# Patient Record
Sex: FEMALE | Race: BLACK OR AFRICAN AMERICAN | NOT HISPANIC OR LATINO | Employment: OTHER | URBAN - METROPOLITAN AREA
[De-identification: names, ages, dates, MRNs, and addresses within clinical notes are randomized per-mention and may not be internally consistent; named-entity substitution may affect disease eponyms.]

---

## 2017-01-14 ENCOUNTER — HOSPITAL ENCOUNTER (EMERGENCY)
Facility: OTHER | Age: 27
Discharge: HOME | End: 2017-01-14
Attending: EMERGENCY MEDICINE
Payer: MEDICAID

## 2017-01-14 VITALS
DIASTOLIC BLOOD PRESSURE: 68 MMHG | SYSTOLIC BLOOD PRESSURE: 106 MMHG | HEART RATE: 74 BPM | HEIGHT: 65 IN | TEMPERATURE: 99 F | BODY MASS INDEX: 22.33 KG/M2 | WEIGHT: 134 LBS | RESPIRATION RATE: 17 BRPM | OXYGEN SATURATION: 99 %

## 2017-01-14 DIAGNOSIS — N93.8 DYSFUNCTIONAL UTERINE BLEEDING: Primary | ICD-10-CM

## 2017-01-14 LAB
ALBUMIN SERPL BCP-MCNC: 4.2 G/DL
ALP SERPL-CCNC: 52 U/L
ALT SERPL W/O P-5'-P-CCNC: 13 U/L
ANION GAP SERPL CALC-SCNC: 10 MMOL/L
AST SERPL-CCNC: 17 U/L
B-HCG UR QL: NEGATIVE
BACTERIA GENITAL QL WET PREP: ABNORMAL
BASOPHILS # BLD AUTO: 0.03 K/UL
BASOPHILS NFR BLD: 0.6 %
BILIRUB SERPL-MCNC: 1.2 MG/DL
BILIRUB UR QL STRIP: NEGATIVE
BUN SERPL-MCNC: 10 MG/DL
CALCIUM SERPL-MCNC: 9.5 MG/DL
CHLORIDE SERPL-SCNC: 106 MMOL/L
CLARITY UR: CLEAR
CLUE CELLS VAG QL WET PREP: ABNORMAL
CO2 SERPL-SCNC: 24 MMOL/L
COLOR UR: YELLOW
CREAT SERPL-MCNC: 1 MG/DL
CTP QC/QA: YES
DIFFERENTIAL METHOD: ABNORMAL
EOSINOPHIL # BLD AUTO: 0.1 K/UL
EOSINOPHIL NFR BLD: 1.9 %
ERYTHROCYTE [DISTWIDTH] IN BLOOD BY AUTOMATED COUNT: 13.4 %
EST. GFR  (AFRICAN AMERICAN): >60 ML/MIN/1.73 M^2
EST. GFR  (NON AFRICAN AMERICAN): >60 ML/MIN/1.73 M^2
FILAMENT FUNGI VAG WET PREP-#/AREA: ABNORMAL
GLUCOSE SERPL-MCNC: 83 MG/DL
GLUCOSE UR QL STRIP: NEGATIVE
HCG INTACT+B SERPL-ACNC: <1.2 MIU/ML
HCT VFR BLD AUTO: 40.6 %
HGB BLD-MCNC: 13.5 G/DL
HGB UR QL STRIP: NEGATIVE
KETONES UR QL STRIP: NEGATIVE
LEUKOCYTE ESTERASE UR QL STRIP: NEGATIVE
LYMPHOCYTES # BLD AUTO: 2.3 K/UL
LYMPHOCYTES NFR BLD: 43.2 %
MCH RBC QN AUTO: 27.6 PG
MCHC RBC AUTO-ENTMCNC: 33.3 %
MCV RBC AUTO: 83 FL
MONOCYTES # BLD AUTO: 0.4 K/UL
MONOCYTES NFR BLD: 8.2 %
NEUTROPHILS # BLD AUTO: 2.4 K/UL
NEUTROPHILS NFR BLD: 45.9 %
NITRITE UR QL STRIP: NEGATIVE
PH UR STRIP: 6 [PH] (ref 5–8)
PLATELET # BLD AUTO: 353 K/UL
PMV BLD AUTO: 9.2 FL
POTASSIUM SERPL-SCNC: 4 MMOL/L
PROT SERPL-MCNC: 8.9 G/DL
PROT UR QL STRIP: NEGATIVE
RBC # BLD AUTO: 4.9 M/UL
SODIUM SERPL-SCNC: 140 MMOL/L
SP GR UR STRIP: 1.02 (ref 1–1.03)
SPECIMEN SOURCE: ABNORMAL
T VAGINALIS GENITAL QL WET PREP: ABNORMAL
URN SPEC COLLECT METH UR: NORMAL
UROBILINOGEN UR STRIP-ACNC: NEGATIVE EU/DL
WBC # BLD AUTO: 5.25 K/UL
WBC #/AREA VAG WET PREP: ABNORMAL
YEAST GENITAL QL WET PREP: ABNORMAL

## 2017-01-14 PROCEDURE — 25000003 PHARM REV CODE 250: Performed by: PHYSICIAN ASSISTANT

## 2017-01-14 PROCEDURE — 87591 N.GONORRHOEAE DNA AMP PROB: CPT

## 2017-01-14 PROCEDURE — 84702 CHORIONIC GONADOTROPIN TEST: CPT

## 2017-01-14 PROCEDURE — 99284 EMERGENCY DEPT VISIT MOD MDM: CPT | Mod: 25

## 2017-01-14 PROCEDURE — 96360 HYDRATION IV INFUSION INIT: CPT

## 2017-01-14 PROCEDURE — 80053 COMPREHEN METABOLIC PANEL: CPT

## 2017-01-14 PROCEDURE — 85025 COMPLETE CBC W/AUTO DIFF WBC: CPT

## 2017-01-14 PROCEDURE — 81025 URINE PREGNANCY TEST: CPT | Performed by: EMERGENCY MEDICINE

## 2017-01-14 PROCEDURE — 81003 URINALYSIS AUTO W/O SCOPE: CPT

## 2017-01-14 PROCEDURE — 87210 SMEAR WET MOUNT SALINE/INK: CPT

## 2017-01-14 RX ADMIN — SODIUM CHLORIDE 1000 ML: 0.9 INJECTION, SOLUTION INTRAVENOUS at 07:01

## 2017-01-14 NOTE — ED AVS SNAPSHOT
[facility]  [address]  [place] [address]               Vivian Moran   2017  6:33 PM   ED    Description:  Female : 1990   Department:  [facility]           Your Care was Coordinated By:     Provider Role From To    Sofya Scanlon MD Attending Provider [phone] --    Shannan Cyr PA-C Physician Assistant [phone] --      Reason for Visit     Vaginal Bleeding           Diagnoses this Visit        Comments    Dysfunctional uterine bleeding    -  Primary       ED Disposition     None           To Do List           [facility]     [facility] -  Assistance  Registered nurses in the [facility] provide clinical advisement, health education, appointment booking, and other advisory services.  Call for this free service at [phone].             Medications           Message regarding Medications     Verify the changes and/or additions to your medication regime listed below are the same as discussed with your clinician today.  If any of these changes or additions are incorrect, please notify your healthcare provider.        These medications were administered today        Dose Freq    sodium chloride 0.9% bolus 1,000 mL 1,000 mL ED 1 Time    Sig: Inject 1,000 mLs into the vein ED 1 Time.    Class: Normal    Route: Intravenous      STOP taking these medications     misoprostol (CYTOTEC) 200 MCG Tab Place 3 tablets (600 mcg total) vaginally every 8 (eight) hours.           Verify that the below list of medications is an accurate representation of the medications you are currently taking.  If none reported, the list may be blank. If incorrect, please contact your healthcare provider. Carry this list with you in case of emergency.           Current Medications     ibuprofen (ADVIL,MOTRIN) 600 MG tablet Take 1 tablet (600 mg total) by mouth every 6 (six) hours as needed for Pain.          "  Clinical Reference Information           Your Vitals Were     BP Pulse Temp Resp Height Weight    108/68 72 98.8 °F (37.1 °C) (Oral) 17 5' 5" (1.651 m) 60.8 kg (134 lb)    SpO2 BMI             100% 22.3 kg/m2         Allergies as of 1/14/2017     No Known Allergies      Immunizations Administered on Date of Encounter - 1/14/2017     None      ED Micro, Lab, POCT     Start Ordered       Status Ordering Provider    01/14/17 2030 01/14/17 2030  Vaginal Screen Vagina  STAT      Final result     01/14/17 2030 01/14/17 2030  C. trachomatis/N. gonorrhoeae by AMP DNA Vagina  STAT      In process     01/14/17 1903 01/14/17 1903  CBC W/ AUTO DIFFERENTIAL  STAT      Final result     01/14/17 1903 01/14/17 1903  Comp. Metabolic Panel  STAT      Final result     01/14/17 1903 01/14/17 1903  hCG, quantitative  STAT      Final result     01/14/17 1903 01/14/17 1903  Urinalysis  STAT      Final result     01/14/17 1818 01/14/17 1817  POCT urine pregnancy  Once      Final result       ED Imaging Orders     Start Ordered       Status Ordering Provider    01/14/17 1903 01/14/17 1903  US Pelvis Complete Non OB  1 time imaging      Final result         Discharge Instructions         Dysfunctional Uterine Bleeding    Dysfunctional uterine bleeding is a condition in which bleeding is abnormal and occurs at unexpected times of the month. This happens due to changes in the hormones that help control a womans menstrual cycle each month.  The bleeding may be heavier or lighter than normal. If you have heavy bleeding often, this can lead to a problem called anemia. With anemia, your red blood cell count is too low. Red blood cells are needed because they help carry oxygen throughout your body. Severe anemia may cause you to look pale and feel very weak or tired. You might also become short of breath easily.  To treat dysfunctional uterine bleeding, medicines are often tried first. If these dont help, further testing and treatments may be " needed. Discuss all of your options with your provider.  Home care  Medicines  If youre prescribed medicines, be sure to take them as directed. Some of the more common medicines you may be prescribed include:  · Hormone therapy (Options include most methods of hormonal birth control such as pills, shots, or a hormone-releasing IUD)  · Nonsteroidal anti-inflammatory drugs (NSAIDs), such as ibuprofen  · Iron supplements, if you have anemia     General care  · Get plenty of rest if you tire easily. Avoid heavy exertion.  · To help relieve pain or cramping that may occur with bleeding, try using a heating pad on the lower belly or back. A warm bath may also help.  Follow-up care  Follow up with your healthcare provider as directed.  When to seek medical advice  Call your healthcare provider right away if:  · Bleeding becomes heavy (soaking 1 pad or tampon every hour for 3 hours)  · Increased abdominal pain  · Irregular bleeding worsens or does not get better even with treatment  · Fever of 100.4ºF (38ºC) or higher, or as directed by your provider  · Signs of anemia, such as pale skin, extreme fatigue or weakness, or shortness of breath  · Dizziness or fainting   © 4204-6483 [employer]. [address] [place] [address]. All rights reserved. This information is not intended as a substitute for professional medical care. Always follow your healthcare professional's instructions.          Discharge References/Attachments     BIRTH CONTROL METHODS (ENGLISH)       [facility] complies with applicable Federal civil rights laws and does not discriminate on the basis of race, color, national origin, age, disability, or sex.        Language Assistance Services     ATTENTION: Language assistance services are available, free of charge. Please call [phone].      ATENCIÓN: Si habla soledad, tiene a ramsey disposición servicios gratuitos de asistencia lingüística. Llame al  [phone].     JOSE Ý: N?u b?n nói Ti?ng Vi?t, có các d?ch v? h? tr? ngôn ng? mi?n phí dành cho b?n. G?i s? [phone].

## 2017-01-15 NOTE — ED PROVIDER NOTES
"Encounter Date: 2017       History     Chief Complaint   Patient presents with    Vaginal Bleeding     Pt reports intermittent vaginal bleeding after having an  at [facility] end of last year. Pt "wants to make sure it's all out"     Review of patient's allergies indicates:  No Known Allergies  HPI Comments: Patient is a 26 y.o. Female, A3, presenting to the emergency department with complaints of persistent vaginal bleeding.  The patient admits that she recently had a medical  performed on 10/24/16.  She does admit that it was her second one this year.  She states that since then, she's had persistent and intermittent vaginal bleeding.  She reports occurs every 2 days.  The patient states that after her previous medical  in the fall, she had retained products of conception was seen here in August.  She reports she is concerned that she is experiencing that problem again now.  She states that she did complete her follow-up appointment with the  clinic, and was told that everything was okay.  She reports some mild abdominal pain and cramping.  She denies taking any medication for symptoms thus far.    The history is provided by the patient.     History reviewed. No pertinent past medical history.  No past medical history pertinent negatives.  Past Surgical History   Procedure Laterality Date    Dilation and curettage of uterus       History reviewed. No pertinent family history.  Social History   Substance Use Topics    Smoking status: Never Smoker    Smokeless tobacco: None    Alcohol use Yes      Comment: ocassionally     Review of Systems   Constitutional: Negative for activity change, appetite change, chills, fatigue and fever.   HENT: Negative for congestion, rhinorrhea, sinus pressure, sneezing, sore throat and trouble swallowing.    Eyes: Negative for photophobia and visual disturbance.   Respiratory: Negative for cough, chest tightness, shortness of breath and wheezing.  "   Cardiovascular: Negative for chest pain and palpitations.   Gastrointestinal: Negative for abdominal pain, constipation, diarrhea, nausea and vomiting.   Genitourinary: Positive for vaginal bleeding. Negative for dysuria, hematuria and urgency.   Musculoskeletal: Negative for back pain, myalgias, neck pain and neck stiffness.   Skin: Negative for color change and wound.   Neurological: Negative for dizziness, weakness, light-headedness, numbness and headaches.   Psychiatric/Behavioral: Negative for agitation and confusion.       Physical Exam   Initial Vitals   BP Pulse Resp Temp SpO2   01/14/17 1812 01/14/17 1812 01/14/17 1812 01/14/17 1812 01/14/17 1812   117/68 103 17 98.8 °F (37.1 °C) 100 %     Physical Exam    Nursing note and vitals reviewed.  Constitutional: Vital signs are normal. She appears well-developed and well-nourished. She is not diaphoretic. She is cooperative.  Non-toxic appearance. She does not have a sickly appearance. She does not appear ill. No distress.   Well appearing, -American female unaccompanied in the emergency department.  She is speaking in clear and full sentences, moving all extremities.  She is in no acute distress.   HENT:   Head: Normocephalic and atraumatic.   Right Ear: External ear normal.   Left Ear: External ear normal.   Nose: Nose normal.   Mouth/Throat: Oropharynx is clear and moist.   Eyes: Conjunctivae and EOM are normal.   Neck: Normal range of motion. Neck supple.   Cardiovascular: Normal rate, regular rhythm and normal heart sounds.   Pulmonary/Chest: Breath sounds normal. No respiratory distress. She has no wheezes. She has no rhonchi. She has no rales.   Abdominal: Soft. Bowel sounds are normal. She exhibits no distension. There is no tenderness. There is no rebound and no guarding.   Genitourinary:   Genitourinary Comments: Normal appearance of the external genitalia, no skin lesions, erythema or masses. Pink vaginal mucosa. Cervix pink with no erythema,  moderate white discharge noted. Cervical os is closed. Uterus smooth and non-enlarged. No CMT, adnexal tenderness. Ovaries are non-palpable.    Musculoskeletal: Normal range of motion.   Neurological: She is alert and oriented to person, place, and time.   Skin: Skin is warm.   Psychiatric: She has a normal mood and affect. Her behavior is normal. Judgment and thought content normal.         ED Course   Procedures  Labs Reviewed   CBC W/ AUTO DIFFERENTIAL - Abnormal; Notable for the following:        Result Value    Platelets 353 (*)     All other components within normal limits   COMPREHENSIVE METABOLIC PANEL - Abnormal; Notable for the following:     Total Protein 8.9 (*)     Total Bilirubin 1.2 (*)     Alkaline Phosphatase 52 (*)     All other components within normal limits   VAGINAL SCREEN - Abnormal; Notable for the following:     Clue Cells, Wet Prep Few (*)     WBC - Vaginal Screen Few (*)     Bacteria - Vaginal Screen Many (*)     All other components within normal limits   C. TRACHOMATIS/N. GONORRHOEAE BY AMP DNA   HCG, QUANTITATIVE, PREGNANCY   URINALYSIS   POCT URINE PREGNANCY        Imaging Results         US Pelvis Complete Non OB (Final result) Result time:  01/14/17 19:53:51    Final result by Brooke Nugent MD (01/14/17 19:53:51)    Impression:        No significant sonographic pelvic abnormality identified.      Electronically signed by: BROOKE NUGENT MD, MD  Date:     01/14/17  Time:    19:53     Narrative:    Comparison: Pelvic ultrasound 8/22/16    Findings: Transabdominal only pelvic ultrasound performed. Patient denied transvaginal portion of the examination. The uterus appears anteverted and measures 5.7 cm in length and 3.6 x 4.8 cm in transverse dimensions.  The endometrium is normal thickness at 0.2 cm.   No discrete uterine fibroids identified.  The ovaries are normal in size and appearance containing small follicles.  The right ovary measures 1.8 x 1.9 x 2.4 cm.  The left ovary measures  2 x 2.3 x 1.7 cm.  Arterial and venous flow demonstrated in both ovaries.  No significant amount of free fluid identified.                 Medical Decision Making:   History:   Old Medical Records: I decided to obtain old medical records.  Old Records Summarized: other records.       <> Summary of Records: Reviewed medical record in epic including previous ED visit on 16 for persistent vaginal bleeding status post medical  when she was diagnosed with retained products of conception.  Clinical Tests:   Lab Tests: Ordered and Reviewed  The following lab test(s) were unremarkable: CBC, CMP, B-HCG, UPT and Urinalysis  Radiological Study: Ordered and Reviewed  Other:   I have discussed this case with another health care provider.       <> Summary of the Discussion: Dr. Scanlon  This note was created using Dragon Medical Dictation. There may be typographical errors secondary to dictation.     Urgent evaluation of a 26 y.o. female,A3, presenting to the emergency department complaining of persistent vaginal bleeding status post medical . Patient is afebrile, nontoxic appearing and hemodynamically stable.  Physical exam reveals regular rate and rhythm, lungs are clear to auscultation bilaterally.  Abdomen is soft and nontender.  Vaginal exam shows moderate white discharge in the vaginal vault, no blood.  No CMT or adnexal tenderness..  We'll obtain blood work, vaginal ultrasound, and plan to reassess.  CBC shows no leukocytosis, H&H is normal.  CMP shows no acute electrolyte abnormality, no elevation of LFTs.  UPT is negative.  UA shows no evidence of acute urinary tract infection.  Beta hCG is less than 1.2.  Ultrasound is obtained that shows no abnormality.  Vaginal screen shows few clue cells, patient is asymptotic, did not elect to treat.  At this time, no further testing or imaging is warranted.  The patient was counseled extensively on symptomatic care and treatment.  I did have a long discussion  with her on the importance of birth control.  She did request birth control from the emergency department, explained her that this is inappropriate, but would give her resources which to obtain some on an outpatient basis. The patient was instructed to follow up with a primary care provider in 2 days or to return to the emergency department for worsening symptoms. The treatment plan was discussed with the patient who demonstrated understanding and comfort with plan. The patient's history, physical exam, and plan of care was discussed with and agreed upon with my supervising physician.     History reviewed. No pertinent past medical history.                  ED Course     Clinical Impression:     1. Dysfunctional uterine bleeding       Disposition:   Disposition: Discharged  Condition: Stable       Shannan Cyr PA-C  01/14/17 [id number]

## 2017-01-15 NOTE — DISCHARGE INSTRUCTIONS
Dysfunctional Uterine Bleeding    Dysfunctional uterine bleeding is a condition in which bleeding is abnormal and occurs at unexpected times of the month. This happens due to changes in the hormones that help control a womans menstrual cycle each month.  The bleeding may be heavier or lighter than normal. If you have heavy bleeding often, this can lead to a problem called anemia. With anemia, your red blood cell count is too low. Red blood cells are needed because they help carry oxygen throughout your body. Severe anemia may cause you to look pale and feel very weak or tired. You might also become short of breath easily.  To treat dysfunctional uterine bleeding, medicines are often tried first. If these dont help, further testing and treatments may be needed. Discuss all of your options with your provider.  Home care  Medicines  If youre prescribed medicines, be sure to take them as directed. Some of the more common medicines you may be prescribed include:  · Hormone therapy (Options include most methods of hormonal birth control such as pills, shots, or a hormone-releasing IUD)  · Nonsteroidal anti-inflammatory drugs (NSAIDs), such as ibuprofen  · Iron supplements, if you have anemia     General care  · Get plenty of rest if you tire easily. Avoid heavy exertion.  · To help relieve pain or cramping that may occur with bleeding, try using a heating pad on the lower belly or back. A warm bath may also help.  Follow-up care  Follow up with your healthcare provider as directed.  When to seek medical advice  Call your healthcare provider right away if:  · Bleeding becomes heavy (soaking 1 pad or tampon every hour for 3 hours)  · Increased abdominal pain  · Irregular bleeding worsens or does not get better even with treatment  · Fever of 100.4ºF (38ºC) or higher, or as directed by your provider  · Signs of anemia, such as pale skin, extreme fatigue or weakness, or shortness of breath  · Dizziness or fainting   ©  3866-8533 [employer]. [address] [place] [address], [place] [address]. All rights reserved. This information is not intended as a substitute for professional medical care. Always follow your healthcare professional's instructions.

## 2017-01-15 NOTE — ED TRIAGE NOTES
Pt presents to ED with c/o intermittent vaginal bleeding since  aprox 2 months ago. Pt reports last episode of bleeding last week, reports some associated abdominal cramping. Pt denies any urinary symptoms. No other complaints. Pt AAO x3.

## 2017-01-17 LAB
C TRACH DNA SPEC QL NAA+PROBE: NEGATIVE
N GONORRHOEA DNA SPEC QL NAA+PROBE: NEGATIVE

## 2017-06-04 ENCOUNTER — HOSPITAL ENCOUNTER (EMERGENCY)
Facility: OTHER | Age: 27
Discharge: HOME | End: 2017-06-04
Attending: EMERGENCY MEDICINE
Payer: MEDICAID

## 2017-06-04 VITALS
BODY MASS INDEX: 22.49 KG/M2 | OXYGEN SATURATION: 99 % | WEIGHT: 135 LBS | HEIGHT: 65 IN | RESPIRATION RATE: 18 BRPM | TEMPERATURE: 98 F | HEART RATE: 84 BPM | DIASTOLIC BLOOD PRESSURE: 62 MMHG | SYSTOLIC BLOOD PRESSURE: 110 MMHG

## 2017-06-04 DIAGNOSIS — M79.671 FOOT PAIN, BILATERAL: ICD-10-CM

## 2017-06-04 DIAGNOSIS — S90.819A: Primary | ICD-10-CM

## 2017-06-04 DIAGNOSIS — M79.672 FOOT PAIN, BILATERAL: ICD-10-CM

## 2017-06-04 LAB
B-HCG UR QL: NEGATIVE
CTP QC/QA: YES

## 2017-06-04 PROCEDURE — 81025 URINE PREGNANCY TEST: CPT | Performed by: EMERGENCY MEDICINE

## 2017-06-04 PROCEDURE — 99283 EMERGENCY DEPT VISIT LOW MDM: CPT | Mod: 25

## 2017-06-04 RX ORDER — MUPIROCIN 20 MG/G
OINTMENT TOPICAL 3 TIMES DAILY
Qty: 22 G | Refills: 0 | Status: SHIPPED | OUTPATIENT
Start: 2017-06-04 | End: 2017-06-14

## 2017-06-04 RX ORDER — OXAPROZIN 600 MG/1
600 TABLET, FILM COATED ORAL 2 TIMES DAILY PRN
Qty: 20 TABLET | Refills: 0 | Status: SHIPPED | OUTPATIENT
Start: 2017-06-04 | End: 2017-07-06

## 2017-06-04 NOTE — ED TRIAGE NOTES
"Patient was at the nail salon and was getting a pedicure when they "shaved down" old skin on the bottom of her feet too far and she started bleeding.  Patient stated it happened to bilateral feet.  Patient stated the bleeding stopped this morning but that it is painful to stand on her feet due to it.   "

## 2017-06-04 NOTE — ED PROVIDER NOTES
"Encounter Date: 6/4/2017       History     Chief Complaint   Patient presents with    Foot Injury     pt c/o bharti feet pain after having a pedicure yesterday. She states that "he used the razor on the botttom of her feet and shave too far down"     Review of patient's allergies indicates:  No Known Allergies  26-year-old female with no significant past medical history presents emergency department with complaints of bilateral foot pain status post pedicure yesterday. She states that they used a razor to shave off the skin on the bottom of her feet and she is concerned that they were too far.  She complains of bleeding and wounds with associated pain with standing.  She denies fever, chills, numbness, weakness or other associated symptoms. She states the pain is a 7 out of 10 and improved with resting and Tylenol.  She states her tetanus vaccine is up-to-date.      The history is provided by the patient.     History reviewed. No pertinent past medical history.  Past Surgical History:   Procedure Laterality Date    DILATION AND CURETTAGE OF UTERUS       History reviewed. No pertinent family history.  Social History   Substance Use Topics    Smoking status: Never Smoker    Smokeless tobacco: Not on file    Alcohol use Yes      Comment: ocassionally     Review of Systems   Constitutional: Negative for chills and fever.   HENT: Negative for sore throat.    Respiratory: Negative for shortness of breath.    Cardiovascular: Negative for chest pain.   Gastrointestinal: Negative for nausea and vomiting.   Genitourinary: Negative for dysuria.   Musculoskeletal: Positive for gait problem. Negative for arthralgias, back pain, myalgias, neck pain and neck stiffness.   Skin: Positive for wound. Negative for rash.   Neurological: Negative for weakness and numbness.   Hematological: Does not bruise/bleed easily.       Physical Exam     Initial Vitals [06/04/17 1250]   BP Pulse Resp Temp SpO2   110/62 84 18 98.2 °F (36.8 °C) 99 % "     Physical Exam    Nursing note and vitals reviewed.  Constitutional: Vital signs are normal. She appears well-developed and well-nourished. She is not diaphoretic.  Non-toxic appearance. No distress.   HENT:   Head: Normocephalic and atraumatic.   Right Ear: External ear normal.   Left Ear: External ear normal.   Nose: Nose normal.   Mouth/Throat: Oropharynx is clear and moist.   Eyes: Conjunctivae, EOM and lids are normal. Pupils are equal, round, and reactive to light. No scleral icterus.   Neck: Normal range of motion and phonation normal. Neck supple.   Cardiovascular: Normal rate, regular rhythm and normal heart sounds. Exam reveals no gallop and no friction rub.    No murmur heard.  Pulmonary/Chest: Breath sounds normal. No respiratory distress. She has no wheezes. She has no rhonchi. She has no rales.   Abdominal: Soft. Normal appearance and bowel sounds are normal. There is no tenderness. There is no rebound and no guarding.   Musculoskeletal: Normal range of motion.        Feet:    No obvious deformities, moving all extremities  Small abrasions noted to the ball of bilateral plantar feet.  No active bleeding.  Diffuse nonspecific pain that is out of proportion to exam.  No erythema, warmth, ecchymosis or significant edema.  Positive calluses bilateral plantar feet.  Capillary refill less than 3 seconds.  No bony deformity or bony tenderness palpation.   Neurological: She is alert and oriented to person, place, and time. She has normal strength and normal reflexes. She displays no atrophy. No sensory deficit. She exhibits normal muscle tone.   Skin: Skin is warm and dry. Abrasion noted. No bruising, no ecchymosis, no laceration, no lesion, no rash and no abscess noted. No erythema.   Psychiatric: She has a normal mood and affect. Her speech is normal and behavior is normal. Judgment normal. Cognition and memory are normal.         ED Course   Procedures  Labs Reviewed   POCT URINE PREGNANCY              Medical Decision Making:   History:   Old Medical Records: I decided to obtain old medical records.  Initial Assessment:   26-year-old female with complaints consistent with abrasions the feet with associated pain.  Vital signs stable, afebrile, neurovascular intact.  She is alert, healthy and nontoxic appearing.  She is in no apparent distress.  Exam documented above.  Minimal small abrasions noted.  No active bleeding.  No signs of infection.  Tetanus is up-to-date.  ED Management:  Emergent workup not indicated.  She is stable will be discharged home with a prescription for mupirocin cream and oxaprozin.  She is requesting a day off of work.  Patient given excuse for tomorrow.  She is to follow-up with a primary care physician in the next 48 hours or return for any worsening signs or symptoms.  This patient was discussed with the attending physician who agrees with treatment plan.  Other:   I have discussed this case with another health care provider.       <> Summary of the Discussion: Mayra  This note was created using Dragon Medical dictation.  There may be typographical errors secondary to dictation.                     ED Course     Clinical Impression:     1. Abrasion, foot, unspecified laterality, initial encounter Active   2. Foot pain, bilateral          Disposition:   Disposition: Discharged  Condition: Stable       Aurea Florez PA-C  06/04/17 [id number]

## 2017-07-06 ENCOUNTER — HOSPITAL ENCOUNTER (EMERGENCY)
Facility: OTHER | Age: 27
Discharge: HOME | End: 2017-07-06
Attending: EMERGENCY MEDICINE
Payer: MEDICAID

## 2017-07-06 VITALS
RESPIRATION RATE: 16 BRPM | TEMPERATURE: 98 F | SYSTOLIC BLOOD PRESSURE: 109 MMHG | OXYGEN SATURATION: 100 % | WEIGHT: 134.94 LBS | BODY MASS INDEX: 22.48 KG/M2 | HEART RATE: 87 BPM | DIASTOLIC BLOOD PRESSURE: 78 MMHG | HEIGHT: 65 IN

## 2017-07-06 DIAGNOSIS — F41.1 ANXIETY REACTION: Primary | ICD-10-CM

## 2017-07-06 DIAGNOSIS — R11.2 NON-INTRACTABLE VOMITING WITH NAUSEA, UNSPECIFIED VOMITING TYPE: ICD-10-CM

## 2017-07-06 DIAGNOSIS — F41.9 ANXIETY: ICD-10-CM

## 2017-07-06 LAB
B-HCG UR QL: NEGATIVE
CTP QC/QA: YES
POCT GLUCOSE: 89 MG/DL (ref 70–110)

## 2017-07-06 PROCEDURE — 82962 GLUCOSE BLOOD TEST: CPT

## 2017-07-06 PROCEDURE — 93005 ELECTROCARDIOGRAM TRACING: CPT

## 2017-07-06 PROCEDURE — 93010 ELECTROCARDIOGRAM REPORT: CPT | Mod: ,,, | Performed by: INTERNAL MEDICINE

## 2017-07-06 PROCEDURE — 99284 EMERGENCY DEPT VISIT MOD MDM: CPT | Mod: 25

## 2017-07-06 PROCEDURE — 81025 URINE PREGNANCY TEST: CPT | Performed by: EMERGENCY MEDICINE

## 2017-07-06 PROCEDURE — 25000003 PHARM REV CODE 250: Performed by: PHYSICIAN ASSISTANT

## 2017-07-06 RX ORDER — HYDROXYZINE HYDROCHLORIDE 25 MG/1
25 TABLET, FILM COATED ORAL EVERY 6 HOURS PRN
Qty: 12 TABLET | Refills: 0 | OUTPATIENT
Start: 2017-07-06 | End: 2018-09-15

## 2017-07-06 RX ORDER — HYDROXYZINE PAMOATE 25 MG/1
25 CAPSULE ORAL
Status: COMPLETED | OUTPATIENT
Start: 2017-07-06 | End: 2017-07-06

## 2017-07-06 RX ORDER — ONDANSETRON 4 MG/1
4 TABLET, ORALLY DISINTEGRATING ORAL
Status: COMPLETED | OUTPATIENT
Start: 2017-07-06 | End: 2017-07-06

## 2017-07-06 RX ORDER — ONDANSETRON 4 MG/1
4 TABLET, ORALLY DISINTEGRATING ORAL EVERY 8 HOURS PRN
Qty: 12 TABLET | Refills: 0 | Status: SHIPPED | OUTPATIENT
Start: 2017-07-06 | End: 2019-01-04

## 2017-07-06 RX ADMIN — HYDROXYZINE PAMOATE 25 MG: 25 CAPSULE ORAL at 10:07

## 2017-07-06 RX ADMIN — ONDANSETRON 4 MG: 4 TABLET, ORALLY DISINTEGRATING ORAL at 10:07

## 2017-07-07 NOTE — ED PROVIDER NOTES
Encounter Date: 7/6/2017       History     Chief Complaint   Patient presents with    Anxiety     and panic attacks w/ vomiting today     Patient is a 26 y.o. female presenting to the emergency department with complaints of anxiety.  The patient reports that she was at work earlier today when she became very stressed out and was felt very anxious.  She states that she started breathing heavily, had to excuse herself, and had an episode of emesis in the bathroom. She reports that she has had similar symptoms in the past when she became very stressed at her job.  She states that she has very stressful job.  She reports that when she left she started feeling better but she does still feel mildly anxious and dizzy.  She denies any abdominal pain, chest pain.  She denies shortness of breath.  She states she has never seen a primary care provider for this.  She denies taking any medication for symptoms thus far.      The history is provided by the patient.     Review of patient's allergies indicates:  No Known Allergies  Past Medical History:   Diagnosis Date    Anxiety      Past Surgical History:   Procedure Laterality Date    DILATION AND CURETTAGE OF UTERUS       History reviewed. No pertinent family history.  Social History   Substance Use Topics    Smoking status: Never Smoker    Smokeless tobacco: Never Used    Alcohol use Yes      Comment: ocassionally     Review of Systems   Constitutional: Negative for activity change, appetite change, chills, fatigue and fever.   HENT: Negative for congestion, rhinorrhea and sore throat.    Eyes: Negative for photophobia and visual disturbance.   Respiratory: Negative for cough, shortness of breath and wheezing.    Cardiovascular: Negative for chest pain.   Gastrointestinal: Positive for nausea and vomiting. Negative for abdominal pain and diarrhea.   Genitourinary: Negative for dysuria, hematuria and urgency.   Musculoskeletal: Negative for back pain, myalgias and neck  pain.   Skin: Negative for color change and wound.   Neurological: Negative for weakness and headaches.   Psychiatric/Behavioral: Negative for agitation and confusion. The patient is nervous/anxious.        Physical Exam     Initial Vitals [07/06/17 2210]   BP Pulse Resp Temp SpO2   102/70 84 16 97.7 °F (36.5 °C) 100 %      MAP       80.67         Physical Exam    Nursing note and vitals reviewed.  Constitutional: Vital signs are normal. She appears well-developed and well-nourished. She is not diaphoretic. She is cooperative.  Non-toxic appearance. She does not have a sickly appearance. She does not appear ill. No distress.   Well appearing, -American female unaccompanied in the emergency department.  She is speaking in clear and full sentences.  She is in no acute distress.    HENT:   Head: Normocephalic and atraumatic.   Right Ear: External ear normal.   Left Ear: External ear normal.   Nose: Nose normal.   Mouth/Throat: Oropharynx is clear and moist.   Eyes: Conjunctivae and EOM are normal.   Neck: Normal range of motion. Neck supple.   Cardiovascular: Normal rate, regular rhythm and normal heart sounds.   Pulmonary/Chest: Breath sounds normal. No respiratory distress. She has no wheezes.   Abdominal: Soft. Bowel sounds are normal. She exhibits no distension. There is no tenderness. There is no rebound and no guarding.   Musculoskeletal: Normal range of motion.   Neurological: She is alert and oriented to person, place, and time. GCS eye subscore is 4. GCS verbal subscore is 5. GCS motor subscore is 6.   Skin: Skin is warm.   Psychiatric: She has a normal mood and affect. Her behavior is normal. Judgment and thought content normal.         ED Course   Procedures  Labs Reviewed   POCT URINE PREGNANCY   POCT GLUCOSE   POCT GLUCOSE MONITORING CONTINUOUS     EKG Readings: (Independently Interpreted)   Initial Reading: No STEMI. Rhythm: Normal Sinus Rhythm. Heart Rate: 72.          Medical Decision Making:    Initial Assessment:   Urgent evaluation of a 26 y.o. female presenting to the emergency department complaining of an episode of anxiety with nausea and vomiting. Patient is afebrile, nontoxic appearing and hemodynamically stable. Physical exam reveals regular rate and rhythm, lungs are clear to auscultation bilaterally.  Patient is resting comfortably in the exam room.  Abdomen is soft and nontender.  Will plan to obtain EKG, Accu-Chek, UPT, administer Atarax, Zofran and reassess.  Independently Interpreted Test(s):   I have ordered and independently interpreted EKG Reading(s) - see prior notes  Clinical Tests:   Lab Tests: Ordered and Reviewed  The following lab test(s) were unremarkable: UPT       <> Summary of Lab: Accucheck   Medical Tests: Ordered and Reviewed  ED Management:  EKG shows normal sinus rhythm with a rate of 72 bpm, no STEMI. Accu-Chek is 89.  UPT is negative. On reassessment, the patient reports much relief of her symptoms.  At this time, do not feel that further testing or imaging is warranted.  Her signs and symptoms are likely secondary to an anxiety reaction.  She is discharged home in stable condition.  She'll be given a prescription for Atarax in addition to Zofran. The patient was instructed to follow up with a primary care provider in 2 days or to return to the emergency department for worsening symptoms. The treatment plan was discussed with the patient who demonstrated understanding and comfort with plan. The patient's history, physical exam, and plan of care was discussed with and agreed upon with my supervising physician.    Other:   I have discussed this case with another health care provider.       <> Summary of the Discussion: Dr. Houston  This note was created using Dragon Medical Dictation. There may be typographical errors secondary to dictation.                    ED Course     Clinical Impression:     1. Anxiety reaction    2. Anxiety    3. Non-intractable vomiting with nausea,  unspecified vomiting type       Disposition:   Disposition: Discharged  Condition: Stable                        Shannan Cyr PA-C  07/06/17 6527

## 2017-07-07 NOTE — ED NOTES
"Pt presents to ED with reports of having 2 panic attacks today while at work, reporting many work stressors from her coworkers, feels as though "my coworkers and me are not on the same page." Pt panic attacks began in May of 2017. Pt reporting dizziness, anxiety, and 2 episodes of vomiting today. Pt AAOx4 and appropriate at this time. Respirations even and unlabored. No acute distress noted.   "

## 2018-09-15 ENCOUNTER — HOSPITAL ENCOUNTER (EMERGENCY)
Facility: HOSPITAL | Age: 28
Discharge: HOME | End: 2018-09-15
Attending: EMERGENCY MEDICINE
Payer: MEDICAID

## 2018-09-15 VITALS
WEIGHT: 165.56 LBS | BODY MASS INDEX: 27.55 KG/M2 | RESPIRATION RATE: 20 BRPM | OXYGEN SATURATION: 98 % | HEART RATE: 100 BPM | SYSTOLIC BLOOD PRESSURE: 130 MMHG | DIASTOLIC BLOOD PRESSURE: 68 MMHG | TEMPERATURE: 98 F

## 2018-09-15 DIAGNOSIS — K64.9 HEMORRHOIDS, UNSPECIFIED HEMORRHOID TYPE: Primary | ICD-10-CM

## 2018-09-15 DIAGNOSIS — J30.89 ALLERGIC RHINITIS DUE TO OTHER ALLERGIC TRIGGER, UNSPECIFIED SEASONALITY: ICD-10-CM

## 2018-09-15 PROCEDURE — 99283 EMERGENCY DEPT VISIT LOW MDM: CPT

## 2018-09-15 RX ORDER — LIDOCAINE HYDROCHLORIDE 20 MG/ML
SOLUTION OROPHARYNGEAL EVERY 4 HOURS PRN
Qty: 100 ML | Refills: 0 | Status: SHIPPED | OUTPATIENT
Start: 2018-09-15 | End: 2019-01-04

## 2018-09-15 RX ORDER — TRIAMCINOLONE ACETONIDE 55 UG/1
2 SPRAY, METERED NASAL DAILY
Qty: 10.8 ML | Refills: 0 | Status: SHIPPED | OUTPATIENT
Start: 2018-09-15 | End: 2019-01-04

## 2018-09-16 NOTE — ED PROVIDER NOTES
"Encounter Date: 9/15/2018    SCRIBE #1 NOTE: I, Meryl Giron , am scribing for, and in the presence of, Dr. Grace .       History     Chief Complaint   Patient presents with    Hemorrhoids     26 weeks pregnant.     Cough     Concerned because she recently found out she had mold in her AC unit.        Time seen by provider: 7:59 PM on 09/15/2018    Vivian Moran is a 27 y.o. female who presents to the ED with complaints of hemorrhoids x 2-3 weeks and a cough with an onset x 8 months. The patient relays that she is 26 weeks pregnant. She reports that she is here for two separate issues. First, the patient reports that she was being teated with Proctozone for one hemorrhoid, but she has had no relief. She reports that recently she noticed a 2nd hemorrhoid. The rectal pain is exacerbated with bowel movements and sitting. She has tried multiple OTC medication, but nothing is working. She claims that she has not seen her OB/GYN since the 2nd hemorrhoid popped up. The patient's second issue is that she has recently been alerted to the fact that her apartment complex has mold in the ventilation system. She reports that for the past 8 months she has been having intermittent  "flu like symptoms" that are consistent with the symptoms people experience who have been exposed to mold, according to [employer]. These symptoms include a non-productive cough, headache, runny nose, sore throat, red eyes, photophobia, and blurred vision. The patient claims that her OB/GYN told her to see her PCP for this concern, because he was not a mold expert. The patient relays that she is most concerned about the headaches, which she reffers to as "migraines". The headaches occur 3-4 days in one week and last for multiple hours. They are localized to the "T zone" of her head (above the nose and across the eyebrows). She has no relief with Tylenol. The patient endorses severe photophobia when she has these headaches. 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 She does not currently have a headache. The patient is concerned because she does not have a PCP and is unsure what medications to take for these symptoms, due to her pregnancy. Patient claims that she drinks a lot of water, eats "clean", and has been very complaint during her pregnancy.  PMHx includes anxiety.           Review of patient's allergies indicates:  No Known Allergies  Past Medical History:   Diagnosis Date    Anxiety      Past Surgical History:   Procedure Laterality Date    DILATION AND CURETTAGE OF UTERUS       No family history on file.  Social History     Tobacco Use    Smoking status: Never Smoker    Smokeless tobacco: Never Used   Substance Use Topics    Alcohol use: Yes     Comment: ocassionally    Drug use: No     Review of Systems   Constitutional: Negative for activity change, appetite change, chills, fatigue and fever.   HENT: Positive for rhinorrhea and sore throat. Negative for nosebleeds and trouble swallowing.    Eyes: Positive for photophobia, redness and visual disturbance.   Respiratory: Positive for cough. Negative for apnea and shortness of breath.    Cardiovascular: Negative for chest pain and palpitations.   Gastrointestinal: Positive for rectal pain. Negative for abdominal distention, abdominal pain, blood in stool and constipation.        Positive hemmorhoids    Genitourinary: Negative for difficulty urinating.   Musculoskeletal: Negative for neck pain.   Skin: Negative for pallor and rash.   Neurological: Positive for headaches.   Hematological: Does not bruise/bleed easily.   Psychiatric/Behavioral: Negative for agitation.       Physical Exam     Initial Vitals [09/15/18 1808]   BP Pulse Resp Temp SpO2   130/68 100 20 97.9 °F (36.6 °C) 98 %      MAP       --         Physical Exam    Nursing note and vitals reviewed.  Constitutional: " She appears well-developed and well-nourished.   HENT:   Head: Normocephalic and atraumatic.   Nose: Right sinus exhibits maxillary sinus tenderness (mild) and frontal sinus tenderness (mild). Left sinus exhibits maxillary sinus tenderness (mild) and frontal sinus tenderness (mild).   Eyes: Conjunctivae and EOM are normal. Pupils are equal, round, and reactive to light. Right conjunctiva is not injected. Left conjunctiva is not injected.   Neck: Normal range of motion. Neck supple.   Cardiovascular: Normal rate, regular rhythm and normal heart sounds. Exam reveals no gallop and no friction rub.    No murmur heard.  Pulmonary/Chest: Effort normal and breath sounds normal. No respiratory distress. She has no wheezes. She has no rhonchi. She has no rales.   Abdominal: Soft. She exhibits no distension. There is no tenderness.   Genitourinary:   Genitourinary Comments: 1 visible hemorrhoid    Musculoskeletal: Normal range of motion.   Lymphadenopathy:     She has no cervical adenopathy.   Neurological: She is alert and oriented to person, place, and time.   Skin: Skin is warm and dry. No erythema.   Psychiatric: She has a normal mood and affect.         ED Course   Procedures  Labs Reviewed - No data to display       Imaging Results    None          Medical Decision Making:   History:   Old Medical Records: I decided to obtain old medical records.  ED Management:  27-year-old female in her 2nd trimester of pregnancy presents with a multitude of complaints. She has intermittent unilateral left-sided headache suggestive of a migraine headache.  Symptoms are currently absent.  She also describes symptoms suggestive of allergies possibly related to mold exposure.  She has intermittent sinus congestion with rhinorrhea and a nonproductive cough.  She will be treated with intranasal steroids.  She also reports a hemorrhoid in found to have a external hemorrhoid.  Treatment options are somewhat limited.  She is given topical  lidocaine and encouraged to use frequent Sitz baths.  She is referred to Colorectal surgery for further management.       APC / Resident Notes:   I, Dr. Estuardo Grace III, personally performed the services described in this documentation. All medical record entries made by the scribe were at my direction and in my presence.  I have reviewed the chart and agree that the record reflects my personal performance and is accurate and complete. Estuardo Grace III, MD.  10:17 PM 09/15/2018       Scribe Attestation:   Scribe #1: I performed the above scribed service and the documentation accurately describes the services I performed. I attest to the accuracy of the note.               Clinical Impression:   The primary encounter diagnosis was Hemorrhoids, unspecified hemorrhoid type. A diagnosis of Allergic rhinitis due to other allergic trigger, unspecified seasonality was also pertinent to this visit.      Disposition:   Disposition: Discharged  Condition: Stable                        Estuardo Grace III, MD  09/15/18 6165

## 2018-10-02 ENCOUNTER — HOSPITAL ENCOUNTER (EMERGENCY)
Facility: HOSPITAL | Age: 28
Discharge: HOME | End: 2018-10-02
Attending: EMERGENCY MEDICINE
Payer: MEDICAID

## 2018-10-02 VITALS
TEMPERATURE: 99 F | WEIGHT: 165.38 LBS | HEART RATE: 108 BPM | HEIGHT: 65 IN | DIASTOLIC BLOOD PRESSURE: 60 MMHG | BODY MASS INDEX: 27.56 KG/M2 | OXYGEN SATURATION: 98 % | SYSTOLIC BLOOD PRESSURE: 124 MMHG | RESPIRATION RATE: 16 BRPM

## 2018-10-02 DIAGNOSIS — J06.9 VIRAL URI: Primary | ICD-10-CM

## 2018-10-02 LAB
DEPRECATED S PYO AG THROAT QL EIA: NEGATIVE
FLUAV AG SPEC QL IA: NEGATIVE
FLUBV AG SPEC QL IA: NEGATIVE
SPECIMEN SOURCE: NORMAL

## 2018-10-02 PROCEDURE — 99283 EMERGENCY DEPT VISIT LOW MDM: CPT

## 2018-10-02 PROCEDURE — 87081 CULTURE SCREEN ONLY: CPT

## 2018-10-02 PROCEDURE — 87400 INFLUENZA A/B EACH AG IA: CPT | Mod: 59

## 2018-10-02 PROCEDURE — 87880 STREP A ASSAY W/OPTIC: CPT

## 2018-10-02 RX ORDER — ACETAMINOPHEN 325 MG/1
650 TABLET ORAL
Status: DISCONTINUED | OUTPATIENT
Start: 2018-10-02 | End: 2018-10-02 | Stop reason: HOSPADM

## 2018-10-02 RX ORDER — IPRATROPIUM BROMIDE 42 UG/1
2 SPRAY, METERED NASAL 4 TIMES DAILY
Qty: 15 ML | Refills: 0 | Status: SHIPPED | OUTPATIENT
Start: 2018-10-02 | End: 2018-10-09

## 2018-10-02 NOTE — ED PROVIDER NOTES
Encounter Date: 10/2/2018    SCRIBE #1 NOTE: Jenni LIANG, florencio scribing for, and in the presence of, Paula Zurita PA-C.       History     Chief Complaint   Patient presents with    Sinusitis     headache, allergies. 7months pregnant       Time seen by provider: 3:40 PM on 10/02/2018    Vivian Moran is a 27 y.o. female who is 7 month pregnant who presents to the ED with URI symptoms onset 1 year. Patient explains that she has had a runny nose, congestion, cough, sinus discomfort. headache, and lightheadedness upon positional changes on and off for 1 year. She states that she recently found out that she had mold in the apartment and is concerned. She notes new complaints of a sore throat 3 days ago that has resolved, chills, and clear discharge from eyes. Patient denies any vaginal complaints, abdominal pain, chest pain, shortness of breath, passing out, trouble swallowing, drooling, numbness, weakness, or measured fever. Patient states she saw her OBGYN regarding the mold discover and her pregnancy, she was evaluated without any findings, and advised to follow up with her PCP. She states she has not followed up with her PCP.        The history is provided by the patient. No  was used.     Review of patient's allergies indicates:  No Known Allergies  Past Medical History:   Diagnosis Date    Anxiety      Past Surgical History:   Procedure Laterality Date    DILATION AND CURETTAGE OF UTERUS       History reviewed. No pertinent family history.  Social History     Tobacco Use    Smoking status: Never Smoker    Smokeless tobacco: Never Used   Substance Use Topics    Alcohol use: Yes     Comment: ocassionally    Drug use: No     Review of Systems   Constitutional: Positive for chills. Negative for fever.   HENT: Positive for congestion, postnasal drip, rhinorrhea, sinus pain and sore throat. Negative for dental problem, drooling, ear discharge, ear pain, facial swelling, hearing  loss, tinnitus, trouble swallowing and voice change.    Eyes: Positive for discharge. Negative for photophobia and visual disturbance.   Respiratory: Positive for cough. Negative for shortness of breath, wheezing and stridor.    Cardiovascular: Negative for chest pain.   Gastrointestinal: Negative for abdominal pain, diarrhea, nausea and vomiting.   Genitourinary: Negative for dysuria, hematuria, vaginal bleeding, vaginal discharge and vaginal pain.   Musculoskeletal: Negative for back pain and neck pain.   Skin: Negative for rash.   Neurological: Positive for light-headedness and headaches. Negative for syncope, speech difficulty, weakness and numbness.   Hematological: Does not bruise/bleed easily.   Psychiatric/Behavioral: Negative for confusion.       Physical Exam     Initial Vitals [10/02/18 1520]   BP Pulse Resp Temp SpO2   124/60 108 16 98.7 °F (37.1 °C) 98 %      MAP       --         Physical Exam    Nursing note and vitals reviewed.  Constitutional: She appears well-developed and well-nourished. She is cooperative.  Non-toxic appearance. She does not have a sickly appearance.   HENT:   Head: Normocephalic and atraumatic.   Right Ear: Tympanic membrane, external ear and ear canal normal.   Left Ear: Tympanic membrane, external ear and ear canal normal.   Nose: Rhinorrhea present.   Mouth/Throat: Uvula is midline and mucous membranes are normal. No uvula swelling. Posterior oropharyngeal erythema present. No oropharyngeal exudate, posterior oropharyngeal edema or tonsillar abscesses.   Eyes: Conjunctivae and lids are normal. Pupils are equal, round, and reactive to light.   Neck: Normal range of motion and full passive range of motion without pain. Neck supple.   Cardiovascular: Normal rate, regular rhythm, normal heart sounds and intact distal pulses. Exam reveals no gallop and no friction rub.    No murmur heard.  Pulmonary/Chest: Breath sounds normal. She has no wheezes. She has no rhonchi. She has no  rales.   Abdominal: Soft. Normal appearance. There is no tenderness. There is no rigidity, no rebound and no guarding.   Gravid abdomen   Neurological: She is alert and oriented to person, place, and time.   Skin: Skin is warm, dry and intact. No rash noted.         ED Course   Procedures  Labs Reviewed   THROAT SCREEN, RAPID   CULTURE, STREP A,  THROAT   INFLUENZA A AND B ANTIGEN          Imaging Results    None          Medical Decision Making:   History:   Old Medical Records: I decided to obtain old medical records.  Clinical Tests:   Lab Tests: Ordered and Reviewed       APC / Resident Notes:   Urgent evaluation of a 27 year old female with complaint of congestion, rhinorrhea, watery eyes and sore throat for a year. No abdominal pain, nausea or vomiting.  Vital signs are stable.  Patient is afebrile.  Abdomen is soft and nontender.  There is no rebound, rigidity or distention.  I doubt intra-abdominal process.  Bilateral TMs with no erythema, retraction or perforation.  There is no mastoid tenderness.  There is no movement tenderness to bilateral ears.  No tonsillar swelling or exudate noted. There is mild posterior oropharyngeal erythema. Uvula is midline.  No concern for ludwigs angina. Breath sounds are clear and equal bilaterally. Workup is negative.  Suspect symptoms are secondary to viral illness.  Symptomatic treatment. Discussed results with patient. Return precautions given. Patient is to follow up with their primary care provider. Case was discussed with Dr. Christine who is in agreement with the plan of care. All questions answered.          Scribe Attestation:   Scribe #1: I performed the above scribed service and the documentation accurately describes the services I performed. I attest to the accuracy of the note.    I, Paula Pabon PA-C, personally performed the services described in this documentation. All medical record entries made by the scribe were at my direction and in my presence.  I have  reviewed the chart and agree that the record reflects my personal performance and is accurate and complete. Paula Pabon PA-C.  5:39 PM 10/02/2018             Clinical Impression:   The encounter diagnosis was Viral URI.      Disposition:   Disposition: Discharged  Condition: Stable                        Paula Pabon PA-C  10/02/18 1748

## 2018-10-05 LAB — BACTERIA THROAT CULT: NORMAL

## 2019-01-04 ENCOUNTER — OFFICE VISIT (OUTPATIENT)
Dept: SURGERY | Facility: CLINIC | Age: 29
End: 2019-01-04
Payer: MEDICAID

## 2019-01-04 VITALS
SYSTOLIC BLOOD PRESSURE: 124 MMHG | BODY MASS INDEX: 24.16 KG/M2 | DIASTOLIC BLOOD PRESSURE: 71 MMHG | WEIGHT: 145 LBS | HEIGHT: 65 IN

## 2019-01-04 DIAGNOSIS — R92.8 ABNORMAL MAMMOGRAM: Primary | ICD-10-CM

## 2019-01-04 DIAGNOSIS — N63.10 BREAST MASS, RIGHT: ICD-10-CM

## 2019-01-04 PROCEDURE — 99204 OFFICE O/P NEW MOD 45 MIN: CPT | Mod: ,,, | Performed by: SURGERY

## 2019-01-04 PROCEDURE — 99204 PR OFFICE/OUTPT VISIT, NEW, LEVL IV, 45-59 MIN: ICD-10-PCS | Mod: ,,, | Performed by: SURGERY

## 2019-01-04 RX ORDER — HYDROCORTISONE 25 MG/G
CREAM TOPICAL
Refills: 1 | COMMUNITY
Start: 2018-12-17 | End: 2019-07-06

## 2019-01-04 RX ORDER — FLUOXETINE 10 MG/1
CAPSULE ORAL
Refills: 1 | COMMUNITY
Start: 2018-09-30 | End: 2019-07-06

## 2019-01-04 RX ORDER — VITAMIN A, ASCORBIC ACID, VITAMIN D, .ALPHA.-TOCOPHEROL, THIAMINE MONONITRATE, RIBOFLAVIN, NIACIN, PYRIDOXINE HYDROCHLORIDE, FOLIC ACID, CYANOCOBALAMIN, CALCIUM, IRON, MAGNESIUM, ZINC, AND COPPER 2700; 70; 400; 30; 1.6; 1.8; 18; 2.5; 1; 12; 100; 65; 25; 25; 2 [IU]/1; MG/1; [IU]/1; [IU]/1; MG/1; MG/1; MG/1; MG/1; MG/1; UG/1; MG/1; MG/1; MG/1; MG/1; MG/1
TABLET ORAL
Refills: 9 | COMMUNITY
Start: 2018-12-17 | End: 2019-07-06

## 2019-01-04 NOTE — LETTER
January 4, 2019      Henrik Galindo MD  [address]  [address]  [place] [address]           [facility] - General Surgery  [address]  [address]  [place] [address]  Phone: [phone]  Fax: [phone]          Patient: Vivian Moran   MR Number: [id number]   YOB: 1990   Date of Visit: 1/4/2019       Dear Dr. Henrik Galindo:    Thank you for referring Vivian Moran to me for evaluation. Attached you will find relevant portions of my assessment and plan of care.    If you have questions, please do not hesitate to call me. I look forward to following Vivian Moran along with you.    Sincerely,    Renee Richards MD    Enclosure  CC:  No Recipients    If you would like to receive this communication electronically, please contact [email or web address] or [phone] to request more information on [employer] Link access.    For providers and/or their staff who would like to refer a patient to [facility], please contact us through our one-stop-shop provider referral line, [facility], at [phone].    If you feel you have received this communication in error or would no longer like to receive these types of communications, please e-mail [email or web address]

## 2019-01-04 NOTE — PROGRESS NOTES
Subjective:       Patient ID: Vivian Moran is a 28 y.o. female.    Chief Complaint: Other (Referred to [facility] by  RT BR BX Sched 19)      HPI:  Breast Mass: Patient presents for evaluation of a breast mass. Change was noted 3 months ago. Patient does routinely do self breast exams.  Patient has noted a change on breast exam. Breast cancer risk factors include none.   Patient is . Age of first live birth was 28. Patient is breast feeding. Patient has nipple discharge (milk). Patient denies to previous breast biopsy. Patient denies a personal history of breast cancer.      Patient just recently delivered a baby 2018. She is presently breast-feeding. Denies any bloody d/c. Towards the end of her second trimester or beginning of her third trimester she noticed a lump in the right breast. It has not really changed much but maybe a little harder or more noticeable.    No FH BRCA    MMG/US - masslike focus 4cm in UOQ right breast    MRI - 6.5pzf8fbc2.9cm asymmetry right breast 11-1:00 (Bi-Rads 4)        History reviewed. No pertinent past medical history.  Past Surgical History:   Procedure Laterality Date    DILATION AND CURETTAGE OF UTERUS      VAGINAL DELIVERY  2018     Review of patient's allergies indicates:  No Known Allergies     Medication List           Accurate as of 19 12:39 PM. If you have any questions, ask your nurse or doctor.               CONTINUE taking these medications    FLUoxetine 10 MG capsule     PROCTO-MED HC 2.5 % rectal cream  Generic drug:  hydrocortisone     VITAFOL-OB 65-1 mg Tab  Generic drug:  prenatal vit 10-iron fum-folic        STOP taking these medications    lidocaine HCl 2% 2 % Soln  Commonly known as:  LIDOCAINE VISCOUS  Stopped by:  Renee Richards MD     ondansetron 4 MG Tbdl  Commonly known as:  ZOFRAN-ODT  Stopped by:  Renee Richards MD     triamcinolone 55 mcg nasal inhaler  Commonly known as:  NASACORT  Stopped by:  Renee  CARRIE Richards MD          Family History   Problem Relation Age of Onset    No Known Problems Mother     No Known Problems Father     No Known Problems Maternal Aunt     No Known Problems Paternal Aunt     No Known Problems Maternal Grandmother     No Known Problems Paternal Grandmother      Social History     Socioeconomic History    Marital status: Single     Spouse name: None    Number of children: None    Years of education: None    Highest education level: None   Social Needs    Financial resource strain: None    Food insecurity - worry: None    Food insecurity - inability: None    Transportation needs - medical: None    Transportation needs - non-medical: None   Occupational History    None   Tobacco Use    Smoking status: Never Smoker    Smokeless tobacco: Never Used   Substance and Sexual Activity    Alcohol use: No     Frequency: Never    Drug use: No    Sexual activity: None   Other Topics Concern    None   Social History Narrative    None         Review of Systems   Constitutional: Negative for appetite change, chills, fever and unexpected weight change.   HENT: Negative for hearing loss, rhinorrhea, sore throat and voice change.    Eyes: Negative for photophobia and visual disturbance.   Respiratory: Negative for cough, choking and shortness of breath.    Cardiovascular: Negative for chest pain, palpitations and leg swelling.   Gastrointestinal: Negative for abdominal pain, blood in stool, constipation, diarrhea, nausea and vomiting.   Endocrine: Negative for cold intolerance, heat intolerance, polydipsia and polyuria.   Musculoskeletal: Negative for arthralgias, back pain, joint swelling and neck stiffness.   Skin: Negative for color change, pallor and rash.   Neurological: Negative for dizziness, seizures, syncope and headaches.   Hematological: Negative for adenopathy. Does not bruise/bleed easily.   Psychiatric/Behavioral: Negative for agitation, behavioral problems and  confusion.       Objective:      Physical Exam   Constitutional: She appears well-developed and well-nourished.  Non-toxic appearance. No distress.   HENT:   Head: Normocephalic and atraumatic. Head is without abrasion and without laceration.   Right Ear: External ear normal.   Left Ear: External ear normal.   Nose: Nose normal.   Mouth/Throat: Oropharynx is clear and moist.   Eyes: EOM are normal. Pupils are equal, round, and reactive to light.   Neck: Trachea normal. No tracheal deviation and normal range of motion present. No thyroid mass and no thyromegaly present.   Cardiovascular: Normal rate and regular rhythm.   Pulmonary/Chest: Effort normal. No accessory muscle usage. No tachypnea. No respiratory distress. Right breast exhibits mass. Right breast exhibits no inverted nipple and no skin change. Left breast exhibits no inverted nipple, no mass and no skin change. Breasts are symmetrical.   Hard irregular mass right breast       Abdominal: Soft. Normal appearance and bowel sounds are normal. She exhibits no distension and no mass. There is no hepatosplenomegaly. There is no tenderness. There is no tenderness at McBurney's point and negative Olivares's sign. No hernia.   Genitourinary: No breast tenderness or discharge.   Lymphadenopathy:     She has no cervical adenopathy.     She has no axillary adenopathy.        Right: No inguinal adenopathy present.        Left: No inguinal adenopathy present.   Neurological: She is alert. Coordination and gait normal.   Skin: Skin is warm and intact.   Psychiatric: She has a normal mood and affect. Her speech is normal and behavior is normal.       Assessment/Plan:   Abnormal mammogram    Breast mass, right      MMG/US/MRI reviewed      Planned procedure: US core bx today      Follow-up for F/U - Make appt after diagnostic tests.

## 2019-01-08 ENCOUNTER — TELEPHONE (OUTPATIENT)
Dept: SURGERY | Facility: CLINIC | Age: 29
End: 2019-01-08

## 2019-01-29 ENCOUNTER — OFFICE VISIT (OUTPATIENT)
Dept: SURGERY | Facility: CLINIC | Age: 29
End: 2019-01-29
Payer: MEDICAID

## 2019-01-29 VITALS
BODY MASS INDEX: 24.16 KG/M2 | DIASTOLIC BLOOD PRESSURE: 80 MMHG | SYSTOLIC BLOOD PRESSURE: 122 MMHG | HEIGHT: 65 IN | WEIGHT: 145 LBS

## 2019-01-29 DIAGNOSIS — N63.10 BREAST MASS, RIGHT: Primary | ICD-10-CM

## 2019-01-29 PROCEDURE — 99213 OFFICE O/P EST LOW 20 MIN: CPT | Mod: ,,, | Performed by: SURGERY

## 2019-01-29 PROCEDURE — 99213 PR OFFICE/OUTPT VISIT, EST, LEVL III, 20-29 MIN: ICD-10-PCS | Mod: ,,, | Performed by: SURGERY

## 2019-01-29 NOTE — PROGRESS NOTES
Subjective:       Patient ID: Vivian Moran is a 28 y.o. female.    Chief Complaint: Other ([facility] Ref RT BR BX FU DOS 19)      HPI:  S/p core bx R Br Mass  Path - lactation adenoma, benign    Pt no longer breast feeding  Feel like it may be a little smaller        Breast Mass: Patient presents for evaluation of a breast mass. Change was noted 3 months ago. Patient does routinely do self breast exams.  Patient has noted a change on breast exam. Breast cancer risk factors include none.   Patient is . Age of first live birth was 28. Patient is breast feeding. Patient has nipple discharge (milk). Patient denies to previous breast biopsy. Patient denies a personal history of breast cancer.        Patient just recently delivered a baby 2018. She is presently breast-feeding. Denies any bloody d/c. Towards the end of her second trimester or beginning of her third trimester she noticed a lump in the right breast. It has not really changed much but maybe a little harder or more noticeable.     No FH BRCA     MMG/US - masslike focus 4cm in UOQ right breast     MRI - 6.8hft4sbl0.9cm asymmetry right breast 11-1:00 (Bi-Rads 4)    Review of Systems   Constitutional: Negative for appetite change, chills, fever and unexpected weight change.   HENT: Negative for hearing loss, rhinorrhea, sore throat and voice change.    Eyes: Negative for photophobia and visual disturbance.   Respiratory: Negative for cough, choking and shortness of breath.    Cardiovascular: Negative for chest pain, palpitations and leg swelling.   Gastrointestinal: Negative for abdominal pain, blood in stool, constipation, diarrhea, nausea and vomiting.   Endocrine: Negative for cold intolerance, heat intolerance, polydipsia and polyuria.   Musculoskeletal: Negative for arthralgias, back pain, joint swelling and neck stiffness.   Skin: Negative for color change, pallor and rash.   Neurological: Negative for dizziness, seizures, syncope and  headaches.   Hematological: Negative for adenopathy. Does not bruise/bleed easily.   Psychiatric/Behavioral: Negative for agitation, behavioral problems and confusion.       Objective:      Physical Exam    Constitutional: She appears well-developed and well-nourished.  Non-toxic appearance. No distress.   HENT:   Head: Normocephalic and atraumatic. Head is without abrasion and without laceration.   Right Ear: External ear normal.   Left Ear: External ear normal.   Nose: Nose normal.   Mouth/Throat: Oropharynx is clear and moist.   Eyes: EOM are normal. Pupils are equal, round, and reactive to light.   Neck: Trachea normal. No tracheal deviation and normal range of motion present. No thyroid mass and no thyromegaly present.   Cardiovascular: Normal rate and regular rhythm.   Pulmonary/Chest: Effort normal. No accessory muscle usage. No tachypnea. No respiratory distress. Right breast exhibits mass. Right breast exhibits no inverted nipple and no skin change. Left breast exhibits no inverted nipple, no mass and no skin change. Breasts are symmetrical.   Hard irregular mass right breast       Abdominal: Soft. Normal appearance and bowel sounds are normal. She exhibits no distension and no mass. There is no hepatosplenomegaly. There is no tenderness. There is no tenderness at McBurney's point and negative Olivares's sign. No hernia.   Genitourinary: No breast tenderness or discharge.   Lymphadenopathy:     She has no cervical adenopathy.     She has no axillary adenopathy.        Right: No inguinal adenopathy present.        Left: No inguinal adenopathy present.   Neurological: She is alert. Coordination and gait normal.   Skin: Skin is warm and intact.   Psychiatric: She has a normal mood and affect. Her speech is normal and behavior is normal.  Assessment/Plan:   Breast mass, right  -     US Breast Right Limited; Future; Expected date: 04/29/2019      Pathology reviewed with patient  Discussed with   Mateo.    Literature reviewed. Many of these lactation adenomas will spontaneously regress after discontinuing breast-feeding. Will observe for now. Plan to repeat ultrasound in 3 months followed by exam. If completely resolved, patient will need nothing further. If still present, I would recommend excision of residual mass.      Follow-up in about 3 months (around 4/29/2019) for F/U - Make appt after diagnostic tests.

## 2019-01-29 NOTE — LETTER
January 29, 2019      Henrik Galindo MD  [address]  [address]  [place] [address]           [facility] - General Surgery  [address]  [address]  [place] [address]  Phone: [phone]  Fax: [phone]          Patient: Vivian Moran   MR Number: [id number]   YOB: 1990   Date of Visit: 1/29/2019       Dear Dr. Henrik Galindo:    Thank you for referring Vivian Moran to me for evaluation. Attached you will find relevant portions of my assessment and plan of care.    If you have questions, please do not hesitate to call me. I look forward to following Vivian Moran along with you.    Sincerely,    Renee Richards MD    Enclosure  CC:  No Recipients    If you would like to receive this communication electronically, please contact [email or web address] or [phone] to request more information on [facility] Link access.    For providers and/or their staff who would like to refer a patient to [facility], please contact us through our one-stop-shop provider referral line, [facility], at [phone].    If you feel you have received this communication in error or would no longer like to receive these types of communications, please e-mail [email or web address]

## 2019-07-06 ENCOUNTER — HOSPITAL ENCOUNTER (EMERGENCY)
Facility: OTHER | Age: 29
Discharge: HOME | End: 2019-07-06
Attending: EMERGENCY MEDICINE
Payer: MEDICAID

## 2019-07-06 VITALS
BODY MASS INDEX: 23.32 KG/M2 | OXYGEN SATURATION: 99 % | RESPIRATION RATE: 14 BRPM | HEIGHT: 65 IN | WEIGHT: 140 LBS | HEART RATE: 83 BPM | SYSTOLIC BLOOD PRESSURE: 110 MMHG | DIASTOLIC BLOOD PRESSURE: 80 MMHG | TEMPERATURE: 98 F

## 2019-07-06 DIAGNOSIS — L84 PLANTAR CALLUS: Primary | ICD-10-CM

## 2019-07-06 DIAGNOSIS — L03.90 CELLULITIS, UNSPECIFIED CELLULITIS SITE: ICD-10-CM

## 2019-07-06 PROCEDURE — 99284 EMERGENCY DEPT VISIT MOD MDM: CPT

## 2019-07-06 RX ORDER — MUPIROCIN 20 MG/G
OINTMENT TOPICAL 3 TIMES DAILY
Qty: 22 G | Refills: 0 | Status: SHIPPED | OUTPATIENT
Start: 2019-07-06 | End: 2019-07-16

## 2019-07-06 RX ORDER — IBUPROFEN 600 MG/1
600 TABLET ORAL EVERY 6 HOURS PRN
Qty: 20 TABLET | Refills: 0 | Status: SHIPPED | OUTPATIENT
Start: 2019-07-06 | End: 2020-02-04 | Stop reason: SDUPTHER

## 2019-07-06 RX ORDER — MUPIROCIN 20 MG/G
1 OINTMENT TOPICAL
Status: DISCONTINUED | OUTPATIENT
Start: 2019-07-06 | End: 2019-07-06 | Stop reason: HOSPADM

## 2019-07-06 RX ORDER — SULFAMETHOXAZOLE AND TRIMETHOPRIM 800; 160 MG/1; MG/1
1 TABLET ORAL 2 TIMES DAILY
Qty: 14 TABLET | Refills: 0 | Status: SHIPPED | OUTPATIENT
Start: 2019-07-06 | End: 2019-07-13

## 2019-07-06 NOTE — ED TRIAGE NOTES
Patient states her friend removed calluses on bottom of both feet. 10/10 pain present. Patient states she cannot walk and currently has a Band-Aid on both feet.     Patient states no home medications, no allergies.

## 2019-07-06 NOTE — ED PROVIDER NOTES
"Encounter Date: 7/6/2019       History     Chief Complaint   Patient presents with    Foot Pain     had a callus removed from both feet, increased pain today      Patient is 28-year-old female who presents with complaints of bilateral foot pain after having calluses removed by her friend yesterday.  She reports that her friend used some sort of metal cutting device and dug out" her calluses.  She reports the procedure caused bleeding and was uncomfortable.  Patient took a warm soaking Epsom salt bath and applied Band-Aids.  Upon waking this morning she had difficulty walking because of the pain. She has no associated fevers, chills, nausea, vomiting, chest pain or shortness of breath.         Review of patient's allergies indicates:  No Known Allergies  History reviewed. No pertinent past medical history.  Past Surgical History:   Procedure Laterality Date    DILATION AND CURETTAGE OF UTERUS      VAGINAL DELIVERY  12/19/2018     Family History   Problem Relation Age of Onset    No Known Problems Mother     No Known Problems Father     No Known Problems Maternal Aunt     No Known Problems Paternal Aunt     No Known Problems Maternal Grandmother     No Known Problems Paternal Grandmother      Social History     Tobacco Use    Smoking status: Never Smoker    Smokeless tobacco: Never Used   Substance Use Topics    Alcohol use: No     Frequency: Never    Drug use: No     Review of Systems   Constitutional: Negative for fever.   HENT: Negative for sore throat.    Respiratory: Negative for shortness of breath.    Cardiovascular: Negative for chest pain.   Gastrointestinal: Negative for nausea.   Genitourinary: Negative for dysuria.   Musculoskeletal: Negative for back pain.   Skin: Negative for rash.        Foot pain    Neurological: Negative for weakness.   Hematological: Does not bruise/bleed easily.       Physical Exam     Initial Vitals [07/06/19 1023]   BP Pulse Resp Temp SpO2   123/74 92 14 98.4 °F " (36.9 °C) 98 %      MAP       --         Physical Exam    Nursing note and vitals reviewed.  Constitutional: She appears well-developed and well-nourished. She is not diaphoretic. No distress.   Healthy appearing female in NAD or apparent pain. She makes good eye contact, speaks in clear full sentences and ambulates with ease.    HENT:   Head: Normocephalic and atraumatic.   Eyes: Conjunctivae and EOM are normal. Pupils are equal, round, and reactive to light. Right eye exhibits no discharge. Left eye exhibits no discharge. No scleral icterus.   Neck: Normal range of motion.   Cardiovascular: Normal rate, regular rhythm, normal heart sounds and intact distal pulses. Exam reveals no gallop and no friction rub.    No murmur heard.  Pulmonary/Chest: Breath sounds normal. She has no wheezes. She has no rhonchi. She has no rales.   Abdominal: Soft. Bowel sounds are normal. There is no tenderness. There is no rebound and no guarding.   Musculoskeletal: Normal range of motion. She exhibits no edema or tenderness.        Feet:    Lymphadenopathy:     She has no cervical adenopathy.   Neurological: She is alert and oriented to person, place, and time. She has normal strength. No cranial nerve deficit or sensory deficit. GCS score is 15. GCS eye subscore is 4. GCS verbal subscore is 5. GCS motor subscore is 6.   Skin: Skin is warm. Capillary refill takes less than 2 seconds. No rash and no abscess noted. No erythema.   There is circular ulcerations to the plantar surface of the feet without bleeding or purulence, the left foot has edema with TTP.    Psychiatric: She has a normal mood and affect. Her behavior is normal. Thought content normal.         ED Course   Procedures  Labs Reviewed - No data to display       Imaging Results    None          Medical Decision Making:   ED Management:  Urgent evaluation a 28-year-old female who presents with complaints of bilateral foot pain after aggressive callus removal by her friend.   She is afebrile, nontoxic appearing, hemodynamically stable. Physical exam outlined above and reveals edema to the plantar surface of the left foot with tenderness to palpation.  There is no purulence or bleeding.  Suspect there could be early cellulitis will cover with Bactrim.  Also cover wound with Bactroban and given ibuprofen for pain. No systemic sequelae.  No abscess to I and D.  Patient is encouraged to follow up with primary care provider in the outpatient setting for wound recheck and is educated on ED return precautions.  She verbalized understanding is amenable to plan.  She is stable for discharge.                      Clinical Impression:       ICD-10-CM ICD-9-CM   1. Plantar callus L84 700   2. Cellulitis, unspecified cellulitis site L03.90 682.9                                Prabha Jeffery PA-C  07/06/19 1130

## 2019-07-19 ENCOUNTER — HOSPITAL ENCOUNTER (EMERGENCY)
Facility: OTHER | Age: 29
Discharge: HOME | End: 2019-07-19
Attending: EMERGENCY MEDICINE
Payer: MEDICAID

## 2019-07-19 VITALS
RESPIRATION RATE: 16 BRPM | BODY MASS INDEX: 25.71 KG/M2 | DIASTOLIC BLOOD PRESSURE: 80 MMHG | HEART RATE: 60 BPM | HEIGHT: 65 IN | SYSTOLIC BLOOD PRESSURE: 120 MMHG | OXYGEN SATURATION: 100 % | TEMPERATURE: 98 F | WEIGHT: 154.31 LBS

## 2019-07-19 DIAGNOSIS — T78.40XA ALLERGIC REACTION, INITIAL ENCOUNTER: Primary | ICD-10-CM

## 2019-07-19 LAB
B-HCG UR QL: NEGATIVE
CTP QC/QA: YES

## 2019-07-19 PROCEDURE — 81025 URINE PREGNANCY TEST: CPT | Performed by: EMERGENCY MEDICINE

## 2019-07-19 PROCEDURE — 63600175 PHARM REV CODE 636 W HCPCS: Performed by: EMERGENCY MEDICINE

## 2019-07-19 PROCEDURE — 99284 EMERGENCY DEPT VISIT MOD MDM: CPT | Mod: 25

## 2019-07-19 PROCEDURE — 25000003 PHARM REV CODE 250: Performed by: EMERGENCY MEDICINE

## 2019-07-19 RX ORDER — CETIRIZINE HYDROCHLORIDE 10 MG/1
10 TABLET ORAL DAILY
Qty: 30 TABLET | Refills: 0 | Status: SHIPPED | OUTPATIENT
Start: 2019-07-19 | End: 2020-07-18

## 2019-07-19 RX ORDER — CETIRIZINE HYDROCHLORIDE 5 MG/1
10 TABLET ORAL
Status: COMPLETED | OUTPATIENT
Start: 2019-07-19 | End: 2019-07-19

## 2019-07-19 RX ORDER — PREDNISONE 20 MG/1
40 TABLET ORAL DAILY
Qty: 10 TABLET | Refills: 0 | Status: SHIPPED | OUTPATIENT
Start: 2019-07-19 | End: 2019-07-24

## 2019-07-19 RX ORDER — PREDNISONE 20 MG/1
60 TABLET ORAL
Status: COMPLETED | OUTPATIENT
Start: 2019-07-19 | End: 2019-07-19

## 2019-07-19 RX ADMIN — CETIRIZINE HYDROCHLORIDE 10 MG: 5 TABLET ORAL at 10:07

## 2019-07-19 RX ADMIN — PREDNISONE 60 MG: 20 TABLET ORAL at 10:07

## 2019-07-20 NOTE — ED TRIAGE NOTES
Pt presents states she bought fresh cut carnations 3 days ago about 30 mins after touching them she said her tonsils became swollen and red.  Pt states she had a similar reaction to carnations in the past and tried not to touch them this time but states she did.  Pt denies chest pain and sob.

## 2019-07-20 NOTE — ED PROVIDER NOTES
"Encounter Date: 7/19/2019    SCRIBE #1 NOTE: I, Jadyn Waddell, am scribing for, and in the presence of, Dr. Valencia.       History     Chief Complaint   Patient presents with    Allergic Reaction     touched a carnation and 30 minutes later her R tonsil "swelled up"     Time seen by provider: 9:51 PM    This is a 28 y.o. female who presents with complaint of right tonsillar swelling for three days. Pt states that this occurred about 30 minutes s/p contact with carnations. She reports pain with swallowing and mild nasal congestion. She reports no fever, chills, rhinorrhea, trouble swallowing, voice change, SOB, choking sensation, lightheadedness, or dizziness. Pt states that she has had a reaction with carnations in the past, itchy "red patches" to the back within an hour of contact with carnations, resolved after one day with Benadryl.    The history is provided by the patient.     Review of patient's allergies indicates:  No Known Allergies  History reviewed. No pertinent past medical history.  Past Surgical History:   Procedure Laterality Date    DILATION AND CURETTAGE OF UTERUS      VAGINAL DELIVERY  12/19/2018     Family History   Problem Relation Age of Onset    No Known Problems Mother     No Known Problems Father     No Known Problems Maternal Aunt     No Known Problems Paternal Aunt     No Known Problems Maternal Grandmother     No Known Problems Paternal Grandmother      Social History     Tobacco Use    Smoking status: Never Smoker    Smokeless tobacco: Never Used   Substance Use Topics    Alcohol use: No     Frequency: Never    Drug use: No     Review of Systems   Constitutional: Negative for chills and fever.   HENT: Positive for congestion. Negative for rhinorrhea, sore throat, trouble swallowing and voice change.         Positive for right tonsillar swelling and pain with swallowing.   Respiratory: Negative for cough, choking and shortness of breath.    Cardiovascular: Negative for chest " pain.   Gastrointestinal: Negative for abdominal pain, diarrhea, nausea and vomiting.   Endocrine: Negative for polyuria.   Genitourinary: Negative for decreased urine volume and dysuria.   Musculoskeletal: Negative for back pain.   Skin: Negative for rash.   Allergic/Immunologic: Negative for immunocompromised state.   Neurological: Negative for dizziness, weakness and light-headedness.   Hematological: Does not bruise/bleed easily.   Psychiatric/Behavioral: Negative for confusion.       Physical Exam     Initial Vitals [07/19/19 2133]   BP Pulse Resp Temp SpO2   120/80 60 16 98.1 °F (36.7 °C) 100 %      MAP       --         Physical Exam    Nursing note and vitals reviewed.  Constitutional: She appears well-developed and well-nourished. She is not diaphoretic. No distress.   HENT:   Head: Normocephalic and atraumatic.   Right Ear: External ear normal.   Left Ear: External ear normal.   Bilateral tonsillar hypertrophy, slightly worse on the right.   Eyes: Right eye exhibits no discharge. Left eye exhibits no discharge.   Neck: Normal range of motion. Neck supple.   Pulmonary/Chest: No respiratory distress.   Musculoskeletal: Normal range of motion.   Neurological: She is alert and oriented to person, place, and time.   Skin: Skin is warm and dry. No rash noted. No erythema.   Psychiatric: She has a normal mood and affect. Her behavior is normal.         ED Course   Procedures  Labs Reviewed   POCT URINE PREGNANCY             Medical Decision Making:   Clinical Tests:   Lab Tests: Ordered and Reviewed            Scribe Attestation:   Scribe #1: I performed the above scribed service and the documentation accurately describes the services I performed. I attest to the accuracy of the note.    Attending Attestation:           Physician Attestation for Scribe:  Physician Attestation Statement for Scribe #1: I, Dr. Valencia, reviewed documentation, as scribed by Jadyn Waddell in my presence, and it is both accurate and  complete.                    Clinical Impression:     1. Allergic reaction, initial encounter          Disposition:   Disposition: Discharged  Condition: Stable                        Jennifer Valencia MD  07/22/19 8443

## 2019-09-17 ENCOUNTER — HOSPITAL ENCOUNTER (EMERGENCY)
Facility: OTHER | Age: 29
Discharge: HOME | End: 2019-09-17
Attending: EMERGENCY MEDICINE
Payer: MEDICAID

## 2019-09-17 VITALS
DIASTOLIC BLOOD PRESSURE: 77 MMHG | BODY MASS INDEX: 24.99 KG/M2 | HEIGHT: 65 IN | WEIGHT: 150 LBS | HEART RATE: 62 BPM | TEMPERATURE: 98 F | RESPIRATION RATE: 18 BRPM | OXYGEN SATURATION: 100 % | SYSTOLIC BLOOD PRESSURE: 117 MMHG

## 2019-09-17 DIAGNOSIS — B96.89 BV (BACTERIAL VAGINOSIS): Primary | ICD-10-CM

## 2019-09-17 DIAGNOSIS — N76.0 BV (BACTERIAL VAGINOSIS): Primary | ICD-10-CM

## 2019-09-17 LAB
B-HCG UR QL: NEGATIVE
BACTERIA GENITAL QL WET PREP: ABNORMAL
BILIRUB UR QL STRIP: NEGATIVE
C TRACH DNA SPEC QL NAA+PROBE: NOT DETECTED
CLARITY UR: ABNORMAL
CLUE CELLS VAG QL WET PREP: ABNORMAL
COLOR UR: YELLOW
CTP QC/QA: YES
FILAMENT FUNGI VAG WET PREP-#/AREA: ABNORMAL
GLUCOSE UR QL STRIP: NEGATIVE
HGB UR QL STRIP: NEGATIVE
KETONES UR QL STRIP: NEGATIVE
LEUKOCYTE ESTERASE UR QL STRIP: ABNORMAL
MICROSCOPIC COMMENT: NORMAL
N GONORRHOEA DNA SPEC QL NAA+PROBE: NOT DETECTED
NITRITE UR QL STRIP: NEGATIVE
PH UR STRIP: 6 [PH] (ref 5–8)
PROT UR QL STRIP: NEGATIVE
SP GR UR STRIP: 1.01 (ref 1–1.03)
SPECIMEN SOURCE: ABNORMAL
T VAGINALIS GENITAL QL WET PREP: ABNORMAL
URN SPEC COLLECT METH UR: ABNORMAL
UROBILINOGEN UR STRIP-ACNC: NEGATIVE EU/DL
WBC #/AREA URNS HPF: 4 /HPF (ref 0–5)
WBC #/AREA VAG WET PREP: ABNORMAL
YEAST GENITAL QL WET PREP: ABNORMAL

## 2019-09-17 PROCEDURE — 81025 URINE PREGNANCY TEST: CPT | Performed by: EMERGENCY MEDICINE

## 2019-09-17 PROCEDURE — 81000 URINALYSIS NONAUTO W/SCOPE: CPT

## 2019-09-17 PROCEDURE — 87210 SMEAR WET MOUNT SALINE/INK: CPT

## 2019-09-17 PROCEDURE — 99283 EMERGENCY DEPT VISIT LOW MDM: CPT

## 2019-09-17 PROCEDURE — 25000003 PHARM REV CODE 250: Performed by: EMERGENCY MEDICINE

## 2019-09-17 PROCEDURE — 87491 CHLMYD TRACH DNA AMP PROBE: CPT

## 2019-09-17 RX ORDER — IBUPROFEN 600 MG/1
600 TABLET ORAL
Status: COMPLETED | OUTPATIENT
Start: 2019-09-17 | End: 2019-09-17

## 2019-09-17 RX ORDER — METRONIDAZOLE 500 MG/1
500 TABLET ORAL 2 TIMES DAILY
Qty: 14 TABLET | Refills: 0 | Status: SHIPPED | OUTPATIENT
Start: 2019-09-17 | End: 2019-09-24

## 2019-09-17 RX ADMIN — IBUPROFEN 600 MG: 600 TABLET, FILM COATED ORAL at 01:09

## 2019-09-17 NOTE — ED PROVIDER NOTES
"Encounter Date: 9/17/2019    SCRIBE #1 NOTE: I, Cynthia Pat, am scribing for, and in the presence of, Dr. Goldberg.       History     Chief Complaint   Patient presents with    Ph level off     "I think my Ph level is off, and I dont feel comfortable talking about anything else out here"     Time seen by provider: 1:09 AM    This is a 28 y.o. female who presents with complaint of vaginal itch and associated yellow discharge and foul odor since today. She reports that she thought she had a yeast infection however after using monistat for 1 week the symptoms remained unchanged and the discharge started to become yellow. She denies any fever, nausea or vomiting. The patient does not have any urinary symptoms and there has been no blood in the urine.     The history is provided by the patient.     Review of patient's allergies indicates:  No Known Allergies  No past medical history on file.  Past Surgical History:   Procedure Laterality Date    DILATION AND CURETTAGE OF UTERUS      VAGINAL DELIVERY  12/19/2018     Family History   Problem Relation Age of Onset    No Known Problems Mother     No Known Problems Father     No Known Problems Maternal Aunt     No Known Problems Paternal Aunt     No Known Problems Maternal Grandmother     No Known Problems Paternal Grandmother      Social History     Tobacco Use    Smoking status: Never Smoker    Smokeless tobacco: Never Used   Substance Use Topics    Alcohol use: No     Frequency: Never    Drug use: No     Review of Systems   Constitutional: Negative for chills and fever.   HENT: Negative for congestion, rhinorrhea and sore throat.    Eyes: Negative for visual disturbance.   Respiratory: Negative for cough and shortness of breath.    Cardiovascular: Negative for chest pain.   Gastrointestinal: Negative for abdominal pain, diarrhea, nausea and vomiting.   Genitourinary: Positive for vaginal discharge. Negative for difficulty urinating, dysuria, frequency, hematuria " and urgency.        Positive for vaginal itch. Positive for foul smelling vagina.    Musculoskeletal: Negative for back pain.   Skin: Negative for rash.   Neurological: Negative for dizziness, weakness and light-headedness.   Psychiatric/Behavioral: Negative for confusion.       Physical Exam     Initial Vitals [09/17/19 0003]   BP Pulse Resp Temp SpO2   117/77 62 18 98.2 °F (36.8 °C) 100 %      MAP       --         Physical Exam    Nursing note and vitals reviewed.  Constitutional: She appears well-developed and well-nourished. She is not diaphoretic. No distress.   HENT:   Head: Normocephalic and atraumatic.   Cardiovascular: Normal rate, regular rhythm and normal heart sounds. Exam reveals no gallop and no friction rub.    No murmur heard.  Pulmonary/Chest: Breath sounds normal. No respiratory distress. She has no wheezes. She has no rhonchi. She has no rales.   Abdominal: Soft. Bowel sounds are normal. She exhibits no distension. There is no tenderness. There is no rebound and no guarding.   Genitourinary:   Genitourinary Comments: No CMT. No Adnexal tenderness. Trace amount of white discharge.    Musculoskeletal: Normal range of motion. She exhibits no edema or tenderness.   Neurological: She is alert and oriented to person, place, and time.   Psychiatric: She has a normal mood and affect. Her behavior is normal.         ED Course   Procedures  Labs Reviewed   URINALYSIS, REFLEX TO URINE CULTURE - Abnormal; Notable for the following components:       Result Value    Appearance, UA Hazy (*)     Leukocytes, UA 2+ (*)     All other components within normal limits    Narrative:     Preferred Collection Type->Urine, Clean Catch   C. TRACHOMATIS/N. GONORRHOEAE BY AMP DNA   URINALYSIS MICROSCOPIC    Narrative:     Preferred Collection Type->Urine, Clean Catch   VAGINAL SCREEN   POCT URINE PREGNANCY          Imaging Results    None          Medical Decision Making:   History:   Old Medical Records: I decided to obtain  old medical records.  Clinical Tests:   Lab Tests: Ordered and Reviewed  Medical Tests: Ordered and Reviewed    Additional MDM:   Comments: 28-year-old female presents complaining of vaginal discharge. Patient concerned for BV.    No exam findings concerning for PID.  UA negative for urinary tract infection.  Wet prep positive for BV.  GC swab pending.  Prescription given for Flagyl.  PCP or gyn follow-up for re-evaluation if symptoms persist despite completing course of Flagyl..          Scribe Attestation:   Scribe #1: I performed the above scribed service and the documentation accurately describes the services I performed. I attest to the accuracy of the note.    Attending Attestation:           Physician Attestation for Scribe:  Physician Attestation Statement for Scribe #1: I, Dr. Goldberg, reviewed documentation, as scribed by Cynthia Pat  in my presence, and it is both accurate and complete.                    Clinical Impression:   No diagnosis found.                               Alexia Goldberg MD  09/17/19 0300

## 2019-09-17 NOTE — ED NOTES
2 Patient identifiers, allergies, and medications verified.    LOC: Patient is awake, alert and oriented X3. Pt aware of environment with an appropriate affect and speaking appropriate.    APPEARANCE: Patient resting comfortably, no acute distress noted, patient is clean and well groomed; clothing is properly fastened.    SKIN: Skin warm and dry, normal skin turgor and moist mucus membranes; no rashes or lesions present. Skin Intact, No breakdown noted.    Musculoskeletal:  Normal ROM noted; moves all extremeties well, No swelling or tenderness noted.    RESPIRATORY: Airway is open and patent, unlabored, spontaneous bilateral respirations spontaneous; normal effort and rate.     CARDIAC: Normal rate and rhythm, no peripheral edema noted, capillary refill < 3 seconds.     ABDOMEN: Soft and non-tender upon palpation, no distention noted.     PULSES: 2+; symmetrical in all 4 extremities    NEUROLOGIC: PERRLA, symmetrical facial expression; normal sensation to all 4 extremities.    Call light within reach, continuous monitoring in place, remains in stretcher at 45 degree angle and wheels locked, VSS, will continue to monitor.

## 2019-10-31 ENCOUNTER — HOSPITAL ENCOUNTER (EMERGENCY)
Facility: OTHER | Age: 29
Discharge: HOME | End: 2019-11-01
Attending: EMERGENCY MEDICINE
Payer: MEDICAID

## 2019-10-31 VITALS
HEIGHT: 65 IN | SYSTOLIC BLOOD PRESSURE: 116 MMHG | RESPIRATION RATE: 18 BRPM | HEART RATE: 72 BPM | OXYGEN SATURATION: 98 % | DIASTOLIC BLOOD PRESSURE: 76 MMHG | TEMPERATURE: 98 F | WEIGHT: 140 LBS | BODY MASS INDEX: 23.32 KG/M2

## 2019-10-31 DIAGNOSIS — R09.82 POST-NASAL DRIP: ICD-10-CM

## 2019-10-31 DIAGNOSIS — N89.8 VAGINAL DISCHARGE: Primary | ICD-10-CM

## 2019-10-31 LAB
B-HCG UR QL: NEGATIVE
BACTERIA GENITAL QL WET PREP: ABNORMAL
BILIRUB UR QL STRIP: NEGATIVE
CLARITY UR: CLEAR
CLUE CELLS VAG QL WET PREP: ABNORMAL
COLOR UR: YELLOW
CTP QC/QA: YES
FILAMENT FUNGI VAG WET PREP-#/AREA: ABNORMAL
GLUCOSE UR QL STRIP: NEGATIVE
HGB UR QL STRIP: NEGATIVE
KETONES UR QL STRIP: NEGATIVE
LEUKOCYTE ESTERASE UR QL STRIP: NEGATIVE
NITRITE UR QL STRIP: NEGATIVE
PH UR STRIP: 6 [PH] (ref 5–8)
PROT UR QL STRIP: NEGATIVE
SP GR UR STRIP: 1.02 (ref 1–1.03)
SPECIMEN SOURCE: ABNORMAL
T VAGINALIS GENITAL QL WET PREP: ABNORMAL
URN SPEC COLLECT METH UR: NORMAL
UROBILINOGEN UR STRIP-ACNC: NEGATIVE EU/DL
WBC #/AREA VAG WET PREP: ABNORMAL
YEAST GENITAL QL WET PREP: ABNORMAL

## 2019-10-31 PROCEDURE — 99284 EMERGENCY DEPT VISIT MOD MDM: CPT

## 2019-10-31 PROCEDURE — 87210 SMEAR WET MOUNT SALINE/INK: CPT

## 2019-10-31 PROCEDURE — 81025 URINE PREGNANCY TEST: CPT | Performed by: EMERGENCY MEDICINE

## 2019-10-31 PROCEDURE — 87491 CHLMYD TRACH DNA AMP PROBE: CPT

## 2019-10-31 PROCEDURE — 81003 URINALYSIS AUTO W/O SCOPE: CPT

## 2019-10-31 RX ORDER — FLUTICASONE PROPIONATE 50 MCG
1 SPRAY, SUSPENSION (ML) NASAL 2 TIMES DAILY
Qty: 15 G | Refills: 0 | OUTPATIENT
Start: 2019-10-31 | End: 2023-07-19

## 2019-10-31 RX ORDER — FLUCONAZOLE 150 MG/1
150 TABLET ORAL DAILY
Qty: 1 TABLET | Refills: 0 | Status: SHIPPED | OUTPATIENT
Start: 2019-10-31 | End: 2019-11-01

## 2019-10-31 RX ORDER — BENZONATATE 100 MG/1
100 CAPSULE ORAL 3 TIMES DAILY PRN
Qty: 12 CAPSULE | Refills: 0 | Status: SHIPPED | OUTPATIENT
Start: 2019-10-31 | End: 2019-11-10

## 2019-10-31 RX ORDER — HYDROXYZINE PAMOATE 25 MG/1
25 CAPSULE ORAL EVERY 6 HOURS PRN
Qty: 10 CAPSULE | Refills: 0 | OUTPATIENT
Start: 2019-10-31 | End: 2023-07-19

## 2019-11-01 LAB
C TRACH DNA SPEC QL NAA+PROBE: NOT DETECTED
N GONORRHOEA DNA SPEC QL NAA+PROBE: NOT DETECTED

## 2019-11-01 NOTE — ED TRIAGE NOTES
Pt presents to ED with c/o non-productive cough x2 days and vaginal discharge. Reports yellow, non-odorous discharge. States she has been taking antibiotics for it that she got from urgent care. Denies fever, N/V/D, hematuria, congestion, and runny nose

## 2019-11-01 NOTE — ED PROVIDER NOTES
Encounter Date: 10/31/2019       History     Chief Complaint   Patient presents with    Vaginal Discharge     patient complains of yellow discharge, without odor, with itching, on ABX for bacterial vaginosis last month, symptoms returned    Cough     says glands are swollen in neck as well, denies fever, congestion, runny nose. non-productive cough starting two days ago, states usually gets sick around season changing     Patient is a 29-year-old female who presents to the emergency department with vaginal discharge, cough, and scratchy throat. Patient reports yellow vaginal discharge with itching irritation for the past week.  She states this feels similar to when she was diagnosed with bacterial vaginosis 1 month ago.  She states she completed all Flagyl except for 2 tablets.  She denies pelvic pain, nausea, emesis or fever.  Patient also reports inflamed lymph nodes on her neck.  She denies significant throat pain. She reports a mild, dry nonproductive cough.  Patient states she self-treated with Monistat and was evaluated at a urgent care.  She states she was sent home with Diflucan.    The history is provided by the patient.     Review of patient's allergies indicates:  No Known Allergies  No past medical history on file.  Past Surgical History:   Procedure Laterality Date    DILATION AND CURETTAGE OF UTERUS      VAGINAL DELIVERY  12/19/2018     Family History   Problem Relation Age of Onset    No Known Problems Mother     No Known Problems Father     No Known Problems Maternal Aunt     No Known Problems Paternal Aunt     No Known Problems Maternal Grandmother     No Known Problems Paternal Grandmother      Social History     Tobacco Use    Smoking status: Never Smoker    Smokeless tobacco: Never Used   Substance Use Topics    Alcohol use: No     Frequency: Never    Drug use: No     Review of Systems   Constitutional: Negative for chills and fever.   HENT: Positive for congestion.    Respiratory:  Positive for cough. Negative for shortness of breath.    Cardiovascular: Negative for chest pain.   Gastrointestinal: Negative for abdominal pain, diarrhea, nausea and vomiting.   Genitourinary: Positive for vaginal discharge. Negative for dysuria and vaginal pain.   Musculoskeletal: Negative for arthralgias.   Skin: Negative for rash and wound.   Allergic/Immunologic: Negative for immunocompromised state.   Neurological: Negative for headaches.   Hematological: Positive for adenopathy.       Physical Exam     Initial Vitals [10/31/19 2250]   BP Pulse Resp Temp SpO2   116/76 72 18 98 °F (36.7 °C) 98 %      MAP       --         Physical Exam    Constitutional: Vital signs are normal. She is cooperative.   HENT:   Head: Normocephalic and atraumatic.   No tonsillar exudate or swelling.  Cobblestoning to posterior oropharynx.   Eyes: Conjunctivae and EOM are normal.   Neck: Normal range of motion. Neck supple.   Cardiovascular: Normal rate, regular rhythm and intact distal pulses.   Pulmonary/Chest: Breath sounds normal. She has no wheezes. She has no rhonchi. She has no rales.   Abdominal: Soft. Bowel sounds are normal. There is no tenderness.   Genitourinary:   Genitourinary Comments: Normal appearance of the external genitalia, no skin lesions, erythema or masses. Pink vaginal mucosa. Cervix pink with no erythema.  Scant amount of clumpy white discharge. Cervical os is closed. No CMT, adnexal tenderness.    Neurological: She is alert and oriented to person, place, and time. GCS eye subscore is 4. GCS verbal subscore is 5. GCS motor subscore is 6.   Skin: Skin is warm and dry. No rash noted.   Psychiatric: She has a normal mood and affect. Her behavior is normal.         ED Course   Procedures  Labs Reviewed   C. TRACHOMATIS/N. GONORRHOEAE BY AMP DNA   URINALYSIS, REFLEX TO URINE CULTURE    Narrative:     Preferred Collection Type->Urine, Clean Catch   VAGINAL SCREEN   POCT URINE PREGNANCY          Imaging Results     None          Medical Decision Making:   Initial Assessment:   Urgent evaluation of a 29 y.o. Female  presenting to the emergency department complaining of vaginal discharge and cough. Patient is afebrile, nontoxic appearing and hemodynamically stable.  Patient with scant amount of white, non malodorous discharge.  Findings consistent with postnasal drip on exam.  She is not hypoxic, tachypneic or having adventitious breath sounds.  I do not suspect pneumonia.  ED Management:  Urinalysis is normal. Wet prep reveals occasional bacteria.  Patient advised on symptomatic care for vaginitis.  She has Ob Gyn appointment in 4 days.                       Clinical Impression:     1. Vaginal discharge    2. Post-nasal drip                               José Luis Nieves PA-C  11/01/19 0002

## 2020-02-04 ENCOUNTER — HOSPITAL ENCOUNTER (EMERGENCY)
Facility: OTHER | Age: 30
Discharge: HOME | End: 2020-02-04
Attending: EMERGENCY MEDICINE
Payer: MEDICAID

## 2020-02-04 VITALS
BODY MASS INDEX: 23.32 KG/M2 | DIASTOLIC BLOOD PRESSURE: 78 MMHG | WEIGHT: 140 LBS | TEMPERATURE: 99 F | HEART RATE: 84 BPM | OXYGEN SATURATION: 98 % | SYSTOLIC BLOOD PRESSURE: 117 MMHG | HEIGHT: 65 IN | RESPIRATION RATE: 16 BRPM

## 2020-02-04 DIAGNOSIS — J06.9 VIRAL URI WITH COUGH: Primary | ICD-10-CM

## 2020-02-04 DIAGNOSIS — R05.9 COUGH: ICD-10-CM

## 2020-02-04 LAB
B-HCG UR QL: NEGATIVE
CTP QC/QA: YES
CTP QC/QA: YES
POC MOLECULAR INFLUENZA A AGN: NEGATIVE
POC MOLECULAR INFLUENZA B AGN: NEGATIVE

## 2020-02-04 PROCEDURE — 81025 URINE PREGNANCY TEST: CPT | Performed by: EMERGENCY MEDICINE

## 2020-02-04 PROCEDURE — 99284 EMERGENCY DEPT VISIT MOD MDM: CPT | Mod: 25

## 2020-02-04 PROCEDURE — 63600175 PHARM REV CODE 636 W HCPCS: Performed by: EMERGENCY MEDICINE

## 2020-02-04 PROCEDURE — 96372 THER/PROPH/DIAG INJ SC/IM: CPT | Mod: 59

## 2020-02-04 PROCEDURE — 25000003 PHARM REV CODE 250: Performed by: EMERGENCY MEDICINE

## 2020-02-04 RX ORDER — ALBUTEROL SULFATE 90 UG/1
1-2 AEROSOL, METERED RESPIRATORY (INHALATION) EVERY 6 HOURS PRN
Qty: 18 G | Refills: 0 | Status: SHIPPED | OUTPATIENT
Start: 2020-02-04 | End: 2021-02-03

## 2020-02-04 RX ORDER — PSEUDOEPHEDRINE HCL 30 MG
30 TABLET ORAL EVERY 4 HOURS PRN
Refills: 0 | COMMUNITY
Start: 2020-02-04

## 2020-02-04 RX ORDER — IBUPROFEN 600 MG/1
600 TABLET ORAL EVERY 6 HOURS PRN
Qty: 30 TABLET | Refills: 0 | OUTPATIENT
Start: 2020-02-04 | End: 2023-06-01

## 2020-02-04 RX ORDER — KETOROLAC TROMETHAMINE 30 MG/ML
15 INJECTION, SOLUTION INTRAMUSCULAR; INTRAVENOUS
Status: COMPLETED | OUTPATIENT
Start: 2020-02-04 | End: 2020-02-04

## 2020-02-04 RX ORDER — GUAIFENESIN AND DEXTROMETHORPHAN HYDROBROMIDE 1200; 60 MG/1; MG/1
1 TABLET, EXTENDED RELEASE ORAL 2 TIMES DAILY PRN
COMMUNITY
Start: 2020-02-04 | End: 2020-02-14

## 2020-02-04 RX ADMIN — KETOROLAC TROMETHAMINE 15 MG: 30 INJECTION, SOLUTION INTRAMUSCULAR; INTRAVENOUS at 05:02

## 2020-02-04 RX ADMIN — GUAIFENESIN AND DEXTROMETHORPHAN HYDROBROMIDE 1 TABLET: 600; 30 TABLET, EXTENDED RELEASE ORAL at 05:02

## 2020-02-04 NOTE — ED PROVIDER NOTES
Encounter Date: 2/4/2020    SCRIBE #1 NOTE: I, Cecilio Winter, am scribing for, and in the presence of, Dr. Earl.       History     Chief Complaint   Patient presents with    Nasal Congestion     pt with congestion , headache. cough  sore throat. x 2 days     Time seen by provider: 4:35 PM    This is a 29 y.o. female who presents with complaint of nasal congestion that began three days ago. She reports that her son has been sick with two ear infections. She is experiencing sore throat, productive cough, change in voice, an episode of diarrhea, and headache. The patient reports that she suffers with migraines and the cold has exacerbated her migraines. She tried taking Claritin, lozenges, Mucinex, and Sudafed with no relief of his symptoms. She denies fever, chills, shortness of breath, chest pain, abdominal pain, nausea, vomiting, and dysuria.     The history is provided by the patient.     Review of patient's allergies indicates:  No Known Allergies  History reviewed. No pertinent past medical history.  Past Surgical History:   Procedure Laterality Date    DILATION AND CURETTAGE OF UTERUS      VAGINAL DELIVERY  12/19/2018     Family History   Problem Relation Age of Onset    No Known Problems Mother     No Known Problems Father     No Known Problems Maternal Aunt     No Known Problems Paternal Aunt     No Known Problems Maternal Grandmother     No Known Problems Paternal Grandmother      Social History     Tobacco Use    Smoking status: Never Smoker    Smokeless tobacco: Never Used   Substance Use Topics    Alcohol use: No     Frequency: Never    Drug use: No     Review of Systems   Constitutional: Negative for chills and fever.   HENT: Positive for congestion, sore throat and voice change.    Eyes: Negative for redness.   Respiratory: Positive for cough (productive). Negative for shortness of breath.    Cardiovascular: Negative for chest pain.   Gastrointestinal: Positive for diarrhea. Negative  for abdominal pain, nausea and vomiting.   Genitourinary: Negative for dysuria.   Skin: Negative for rash.   Neurological: Positive for headaches.   Psychiatric/Behavioral: Negative for confusion.       Physical Exam     Initial Vitals [02/04/20 1530]   BP Pulse Resp Temp SpO2   119/80 84 18 98.4 °F (36.9 °C) 100 %      MAP       --         Physical Exam    Nursing note and vitals reviewed.  Constitutional: She appears well-developed and well-nourished. She is not diaphoretic. No distress.   HENT:   Head: Normocephalic and atraumatic.   Mouth/Throat: Oropharynx is clear and moist.   Nasal congestion. No sinus tenderness. Mild faint oropharynx erythema. No exudate.    Eyes: EOM are normal. Pupils are equal, round, and reactive to light. Right eye exhibits no discharge. Left eye exhibits no discharge.   Neck: Normal range of motion.   Cardiovascular: Normal rate, regular rhythm and normal heart sounds. Exam reveals no gallop and no friction rub.    No murmur heard.  Pulmonary/Chest: Breath sounds normal. No respiratory distress. She has no wheezes. She has no rhonchi. She has no rales.   Abdominal: Soft. There is no tenderness. There is no rebound and no guarding.   Musculoskeletal: Normal range of motion. She exhibits no edema or tenderness.   Neurological: She is alert and oriented to person, place, and time.   Skin: Skin is warm and dry. No rash and no abscess noted. No erythema. No pallor.   Psychiatric: She has a normal mood and affect. Her behavior is normal. Judgment and thought content normal.         ED Course   Procedures  Labs Reviewed   POCT URINE PREGNANCY   POCT INFLUENZA A/B MOLECULAR          Imaging Results          X-Ray Chest PA And Lateral (Final result)  Result time 02/04/20 17:26:35    Final result by Shaan Weinberg MD (02/04/20 17:26:35)                 Impression:      No acute process.      Electronically signed by: Shaan Weinberg MD  Date:    02/04/2020  Time:    17:26             Narrative:     EXAMINATION:  XR CHEST PA AND LATERAL    CLINICAL HISTORY:  Cough    TECHNIQUE:  PA and lateral views of the chest were performed.    COMPARISON:  12/29/2011.    FINDINGS:  The trachea is unremarkable.  The cardiothymic silhouette is within normal limits.  The hilar structures are unremarkable.  The hemidiaphragms are within normal limits.  There is no evidence of free air beneath the hemidiaphragms.  There is absence of the splenic shadow.  There are no pleural effusions.  There is no evidence of a pneumothorax.  There is no evidence of pneumomediastinum.  No airspace opacity is present.  The osseous structures are unremarkable.                                 Medical Decision Making:   History:   Old Medical Records: I decided to obtain old medical records.  Initial Assessment:       Healthy 29-year-old female presents with nasal congestion that started 3 days ago, with sore throat, productive cough, and headaches.  Her son was recently sick with URI symptoms then developed an ear infection.  She denies any fever, SOB, vomiting, or any other associated complaints. On exam she is well appearing and afebrile, with nasal congestion but no sign of sinusitis.  She does have mild posterior pharynx erythema but no exudates or other signs of strep throat, which is very unlikely given other associated symptoms of cough and lack of fever or cervical LAD.  She has had similar headaches in the past with migraines; no sign of meningitis or intracranial hemorrhage.  Most likely viral URI, will also evaluate for influenza or less likely pneumonia.    Flu swab negative. Chest x-ray with no acute findings.  Headache resolved after Toradol and she feels somewhat improved. Patient educated on supportive care for viral URI, including NSAIDs p.r.n., decongestant, Mucinex DM for cough suppression, and albuterol for any wheezing associated with cough.  She will return to the ED for any worsening symptoms or other concerns, follow up  with PCP to ensure improvement.      Independently Interpreted Test(s):   I have ordered and independently interpreted X-rays - see prior notes.  Clinical Tests:   Lab Tests: Ordered and Reviewed  Radiological Study: Ordered and Reviewed            Scribe Attestation:   Scribe #1: I performed the above scribed service and the documentation accurately describes the services I performed. I attest to the accuracy of the note.    Attending Attestation:           Physician Attestation for Scribe:  Physician Attestation Statement for Scribe #1: I, Dr. Earl, reviewed documentation, as scribed by Cecilio Winter in my presence, and it is both accurate and complete.                               Clinical Impression:     1. Viral URI with cough    2. Cough                              Wale Earl MD  02/06/20 8105

## 2020-02-04 NOTE — ED TRIAGE NOTES
Pt states she started with sore throat about 2 days ago. Symptoms have progressed over the last two days. Pt states she now has runny nose, chest congestion, and cough

## 2020-06-01 ENCOUNTER — HOSPITAL ENCOUNTER (EMERGENCY)
Facility: OTHER | Age: 30
Discharge: HOME | End: 2020-06-01
Attending: EMERGENCY MEDICINE
Payer: MEDICAID

## 2020-06-01 VITALS
HEIGHT: 65 IN | BODY MASS INDEX: 24.16 KG/M2 | DIASTOLIC BLOOD PRESSURE: 76 MMHG | TEMPERATURE: 98 F | OXYGEN SATURATION: 99 % | HEART RATE: 64 BPM | WEIGHT: 145 LBS | RESPIRATION RATE: 18 BRPM | SYSTOLIC BLOOD PRESSURE: 109 MMHG

## 2020-06-01 DIAGNOSIS — R51.9 RECURRENT HEADACHE: Primary | ICD-10-CM

## 2020-06-01 LAB
ANION GAP SERPL CALC-SCNC: 8 MMOL/L (ref 8–16)
B-HCG UR QL: NEGATIVE
BASOPHILS # BLD AUTO: 0.04 K/UL (ref 0–0.2)
BASOPHILS NFR BLD: 0.8 % (ref 0–1.9)
BUN SERPL-MCNC: 9 MG/DL (ref 6–20)
CALCIUM SERPL-MCNC: 9.5 MG/DL (ref 8.7–10.5)
CHLORIDE SERPL-SCNC: 106 MMOL/L (ref 95–110)
CO2 SERPL-SCNC: 24 MMOL/L (ref 23–29)
CREAT SERPL-MCNC: 0.8 MG/DL (ref 0.5–1.4)
CTP QC/QA: YES
DIFFERENTIAL METHOD: ABNORMAL
EOSINOPHIL # BLD AUTO: 0.1 K/UL (ref 0–0.5)
EOSINOPHIL NFR BLD: 1.4 % (ref 0–8)
ERYTHROCYTE [DISTWIDTH] IN BLOOD BY AUTOMATED COUNT: 13.2 % (ref 11.5–14.5)
EST. GFR  (AFRICAN AMERICAN): >60 ML/MIN/1.73 M^2
EST. GFR  (NON AFRICAN AMERICAN): >60 ML/MIN/1.73 M^2
GLUCOSE SERPL-MCNC: 85 MG/DL (ref 70–110)
HCT VFR BLD AUTO: 38.2 % (ref 37–48.5)
HGB BLD-MCNC: 12.4 G/DL (ref 12–16)
IMM GRANULOCYTES # BLD AUTO: 0.01 K/UL (ref 0–0.04)
IMM GRANULOCYTES NFR BLD AUTO: 0.2 % (ref 0–0.5)
LYMPHOCYTES # BLD AUTO: 2.8 K/UL (ref 1–4.8)
LYMPHOCYTES NFR BLD: 56.7 % (ref 18–48)
MCH RBC QN AUTO: 27.9 PG (ref 27–31)
MCHC RBC AUTO-ENTMCNC: 32.5 G/DL (ref 32–36)
MCV RBC AUTO: 86 FL (ref 82–98)
MONOCYTES # BLD AUTO: 0.4 K/UL (ref 0.3–1)
MONOCYTES NFR BLD: 8.4 % (ref 4–15)
NEUTROPHILS # BLD AUTO: 1.6 K/UL (ref 1.8–7.7)
NEUTROPHILS NFR BLD: 32.5 % (ref 38–73)
NRBC BLD-RTO: 0 /100 WBC
PLATELET # BLD AUTO: 308 K/UL (ref 150–350)
PMV BLD AUTO: 9.4 FL (ref 9.2–12.9)
POTASSIUM SERPL-SCNC: 4.1 MMOL/L (ref 3.5–5.1)
RBC # BLD AUTO: 4.45 M/UL (ref 4–5.4)
SODIUM SERPL-SCNC: 138 MMOL/L (ref 136–145)
WBC # BLD AUTO: 4.9 K/UL (ref 3.9–12.7)

## 2020-06-01 PROCEDURE — 63600175 PHARM REV CODE 636 W HCPCS: Performed by: PHYSICIAN ASSISTANT

## 2020-06-01 PROCEDURE — 85025 COMPLETE CBC W/AUTO DIFF WBC: CPT

## 2020-06-01 PROCEDURE — 96375 TX/PRO/DX INJ NEW DRUG ADDON: CPT

## 2020-06-01 PROCEDURE — 96361 HYDRATE IV INFUSION ADD-ON: CPT

## 2020-06-01 PROCEDURE — 25000003 PHARM REV CODE 250: Performed by: PHYSICIAN ASSISTANT

## 2020-06-01 PROCEDURE — 99284 EMERGENCY DEPT VISIT MOD MDM: CPT | Mod: 25

## 2020-06-01 PROCEDURE — 81025 URINE PREGNANCY TEST: CPT | Performed by: EMERGENCY MEDICINE

## 2020-06-01 PROCEDURE — 96374 THER/PROPH/DIAG INJ IV PUSH: CPT

## 2020-06-01 PROCEDURE — 80048 BASIC METABOLIC PNL TOTAL CA: CPT

## 2020-06-01 RX ORDER — BUTALBITAL, ACETAMINOPHEN AND CAFFEINE 50; 325; 40 MG/1; MG/1; MG/1
1 TABLET ORAL
Status: COMPLETED | OUTPATIENT
Start: 2020-06-01 | End: 2020-06-01

## 2020-06-01 RX ORDER — KETOROLAC TROMETHAMINE 30 MG/ML
15 INJECTION, SOLUTION INTRAMUSCULAR; INTRAVENOUS
Status: COMPLETED | OUTPATIENT
Start: 2020-06-01 | End: 2020-06-01

## 2020-06-01 RX ORDER — METOCLOPRAMIDE HYDROCHLORIDE 5 MG/ML
10 INJECTION INTRAMUSCULAR; INTRAVENOUS
Status: COMPLETED | OUTPATIENT
Start: 2020-06-01 | End: 2020-06-01

## 2020-06-01 RX ORDER — DIPHENHYDRAMINE HCL 25 MG
25 CAPSULE ORAL
Status: COMPLETED | OUTPATIENT
Start: 2020-06-01 | End: 2020-06-01

## 2020-06-01 RX ADMIN — BUTALBITAL, ACETAMINOPHEN, AND CAFFEINE 1 TABLET: 50; 325; 40 TABLET ORAL at 01:06

## 2020-06-01 RX ADMIN — SODIUM CHLORIDE 1000 ML: 0.9 INJECTION, SOLUTION INTRAVENOUS at 12:06

## 2020-06-01 RX ADMIN — METOCLOPRAMIDE 10 MG: 5 INJECTION, SOLUTION INTRAMUSCULAR; INTRAVENOUS at 12:06

## 2020-06-01 RX ADMIN — DIPHENHYDRAMINE HYDROCHLORIDE 25 MG: 25 CAPSULE ORAL at 12:06

## 2020-06-01 RX ADMIN — KETOROLAC TROMETHAMINE 15 MG: 30 INJECTION, SOLUTION INTRAMUSCULAR at 12:06

## 2020-06-01 NOTE — ED NOTES
"Pt to ED with c/o H/A x1 month. Self reports PMH of migraines, currently admits to being on "preventative medicine" for s/s. Pt reporting H/A x1 month without relief of s/s. Pt reports pain is like a band squeezing around her head. Pt admits to sonophobia and photophobia. Denies N/V, SOB, CP, changes in gait. Will continue to monitor.     Two patient identifiers have been checked and are correct.      Appearance: Pt awake, alert & oriented to person, place & time. Pt in no acute distress at present time. Pt is clean and well groomed with clothes appropriately fastened.   Skin: Skin warm, dry & intact. Color consistent with ethnicity. Mucous membranes moist. No breakdown or brusing noted.   Musculoskeletal: Patient moving all extremities well, no obvious swelling or deformities noted.   Respiratory: Respirations spontaneous, even, and non-labored. Visible chest rise noted. Airway is open and patent. No accessory muscle use noted.   Neurologic: Sensation is intact. Speech is clear and appropriate. Eyes open spontaneously, behavior appropriate to situation, follows commands, facial expression symmetrical, bilateral hand grasp equal and even, purposeful motor response noted. Sonophobia, photophobia.  Cardiac: All peripheral pulses present. No Bilateral lower extremity edema. Cap refill is <3 seconds.  Abdomen: Abdomen soft, non-tender to palpation.   : Pt reports no dysuria or hematuria.             "

## 2020-06-01 NOTE — ED PROVIDER NOTES
"Encounter Date: 6/1/2020       History     Chief Complaint   Patient presents with    Headache     +Pt reporting chronic H/A x1 month, reports pain "like a band" around her head. +Photophobia. PMH of migraines. No relief from home medications.      Patient is a 29-year-old female who presents to the emergency department with a headache.  Patient reports a bandlike headache and periorbital pain for the past month.  She states she has had this headache every day for the past month.  She has been taking and Excedrin, Motrin, and Tylenol.  She has been seen at urgent care a few times and has received steroid injection.  She also reports photophobia and sonophobia.  She reports intermittent nausea but denies emesis.  She has follow-up with her primary care provider who prescribed her amitriptyline.  She states she has been taking this for the past 4 days.  She states has helped with some pressure .  She is rating her headache a 10/10.  She states this is causing her anxiety.  She has not followed up with neurologist.  Patient is concerned that she might have an aneurysm.  She denies hypertension, smoking history, or strong family history.    The history is provided by the patient.     Review of patient's allergies indicates:  No Known Allergies  History reviewed. No pertinent past medical history.  Past Surgical History:   Procedure Laterality Date    DILATION AND CURETTAGE OF UTERUS      VAGINAL DELIVERY  12/19/2018     Family History   Problem Relation Age of Onset    No Known Problems Mother     No Known Problems Father     No Known Problems Maternal Aunt     No Known Problems Paternal Aunt     No Known Problems Maternal Grandmother     No Known Problems Paternal Grandmother      Social History     Tobacco Use    Smoking status: Never Smoker    Smokeless tobacco: Never Used   Substance Use Topics    Alcohol use: No     Frequency: Never    Drug use: No     Review of Systems   Constitutional: Negative for " chills and fever.   HENT: Negative for congestion.    Eyes: Positive for photophobia. Negative for visual disturbance.   Respiratory: Negative for cough and shortness of breath.    Cardiovascular: Negative for chest pain.   Gastrointestinal: Positive for nausea. Negative for abdominal pain, diarrhea and vomiting.   Musculoskeletal: Negative for arthralgias.   Skin: Negative for rash.   Allergic/Immunologic: Negative for immunocompromised state.   Neurological: Positive for headaches. Negative for syncope.       Physical Exam     Initial Vitals [06/01/20 1205]   BP Pulse Resp Temp SpO2   127/74 76 18 98 °F (36.7 °C) 100 %      MAP       --         Physical Exam    Constitutional: Vital signs are normal. She is cooperative.   Patient is sitting and dark room.  No distress.   HENT:   Head: Normocephalic and atraumatic.   Eyes: Conjunctivae and EOM are normal.   Neck: Normal range of motion. Neck supple.   Cardiovascular: Normal rate, regular rhythm and intact distal pulses.   Pulmonary/Chest: Breath sounds normal. No respiratory distress. She has no wheezes. She has no rales.   Musculoskeletal: Normal range of motion.   Neurological: GCS eye subscore is 4. GCS verbal subscore is 5. GCS motor subscore is 6.   AAOx4. CN II-XII were intact. Good finger-to-nose task ability. Jose Alfredo intact. Strength 5/5 in all extremities.    Skin: Skin is warm and dry. No rash noted.         ED Course   Procedures  Labs Reviewed   CBC W/ AUTO DIFFERENTIAL - Abnormal; Notable for the following components:       Result Value    Gran # (ANC) 1.6 (*)     Gran% 32.5 (*)     Lymph% 56.7 (*)     All other components within normal limits   BASIC METABOLIC PANEL   POCT URINE PREGNANCY          Imaging Results          CT Head Without Contrast (Final result)  Result time 06/01/20 13:18:17    Final result by Facundo Armendariz MD (06/01/20 13:18:17)                 Impression:      1. No acute intracranial abnormalities.      Electronically signed  by: Facundo Armendariz MD  Date:    06/01/2020  Time:    13:18             Narrative:    EXAMINATION:  CT HEAD WITHOUT CONTRAST    CLINICAL HISTORY:  Headache, basilar or orbital;    TECHNIQUE:  Low dose axial images were obtained through the head.  Coronal and sagittal reformations were also performed. Contrast was not administered.    COMPARISON:  07/30/2017    FINDINGS:  There is no evidence of acute major vascular territory infarct, hemorrhage, or mass.  There is no hydrocephalus.  There are no abnormal extra-axial fluid collections.  The paranasal sinuses and mastoid air cells are clear, and there is no evidence of calvarial fracture.  The visualized soft tissues are unremarkable.                                 Medical Decision Making:   Initial Assessment:   Urgent evaluation of a 29 y.o. female presenting to the emergency department complaining of chronic, everyday headaches x1 month. Patient is afebrile, nontoxic appearing and hemodynamically stable.  There is no focal weakness, numbness, meningismus, or other focal neurologic deficit. There is no history of trauma, fevers, elevated blood pressure to suggest meningitis, subarachnoid hemorrhage, TIA, stroke, mass, or hypertensive urgency.  Given chronicity of headache, with multiple interventions and no complete resolution of headache, will obtain head CT.    ED Management:  -CT head without any acute intracranial abnormality.  -blood work with no acute abnormality.  -upon reassessment, patient states her headache has slightly improved, now is rating 7/10.  Will give Fioricet and reassess.  - patient given information to follow up with neurology clinic.  Advised that it may take amitriptyline 2 weeks to receive full affect.  Patient was given strict return precautions to the emergency department for new worsening symptoms.                                 Clinical Impression:        1. Recurrent headache                               José Luis Nieves  CHRISTINA  06/01/20 1427

## 2020-09-03 ENCOUNTER — HOSPITAL ENCOUNTER (EMERGENCY)
Facility: OTHER | Age: 30
Discharge: HOME | End: 2020-09-03
Attending: EMERGENCY MEDICINE
Payer: MEDICAID

## 2020-09-03 VITALS
BODY MASS INDEX: 20.73 KG/M2 | HEIGHT: 69 IN | SYSTOLIC BLOOD PRESSURE: 110 MMHG | TEMPERATURE: 98 F | DIASTOLIC BLOOD PRESSURE: 76 MMHG | OXYGEN SATURATION: 99 % | RESPIRATION RATE: 18 BRPM | HEART RATE: 72 BPM | WEIGHT: 140 LBS

## 2020-09-03 DIAGNOSIS — R53.1 WEAKNESS: ICD-10-CM

## 2020-09-03 DIAGNOSIS — R51.9 HEADACHE, UNSPECIFIED HEADACHE TYPE: Primary | ICD-10-CM

## 2020-09-03 LAB
ALBUMIN SERPL BCP-MCNC: 4.4 G/DL (ref 3.5–5.2)
ALP SERPL-CCNC: 48 U/L (ref 55–135)
ALT SERPL W/O P-5'-P-CCNC: 23 U/L (ref 10–44)
ANION GAP SERPL CALC-SCNC: 11 MMOL/L (ref 8–16)
AST SERPL-CCNC: 21 U/L (ref 10–40)
B-HCG UR QL: NEGATIVE
BACTERIA #/AREA URNS HPF: ABNORMAL /HPF
BASOPHILS # BLD AUTO: 0.05 K/UL (ref 0–0.2)
BASOPHILS NFR BLD: 0.8 % (ref 0–1.9)
BILIRUB SERPL-MCNC: 0.5 MG/DL (ref 0.1–1)
BILIRUB UR QL STRIP: NEGATIVE
BUN SERPL-MCNC: 12 MG/DL (ref 6–20)
CALCIUM SERPL-MCNC: 9.4 MG/DL (ref 8.7–10.5)
CHLORIDE SERPL-SCNC: 102 MMOL/L (ref 95–110)
CLARITY UR: CLEAR
CO2 SERPL-SCNC: 25 MMOL/L (ref 23–29)
COLOR UR: YELLOW
CREAT SERPL-MCNC: 1 MG/DL (ref 0.5–1.4)
CTP QC/QA: YES
DIFFERENTIAL METHOD: ABNORMAL
EOSINOPHIL # BLD AUTO: 0.1 K/UL (ref 0–0.5)
EOSINOPHIL NFR BLD: 1.5 % (ref 0–8)
ERYTHROCYTE [DISTWIDTH] IN BLOOD BY AUTOMATED COUNT: 12.2 % (ref 11.5–14.5)
EST. GFR  (AFRICAN AMERICAN): >60 ML/MIN/1.73 M^2
EST. GFR  (NON AFRICAN AMERICAN): >60 ML/MIN/1.73 M^2
GLUCOSE SERPL-MCNC: 102 MG/DL (ref 70–110)
GLUCOSE UR QL STRIP: NEGATIVE
HCT VFR BLD AUTO: 38.7 % (ref 37–48.5)
HGB BLD-MCNC: 12.6 G/DL (ref 12–16)
HGB UR QL STRIP: NEGATIVE
IMM GRANULOCYTES # BLD AUTO: 0.01 K/UL (ref 0–0.04)
IMM GRANULOCYTES NFR BLD AUTO: 0.2 % (ref 0–0.5)
KETONES UR QL STRIP: NEGATIVE
LEUKOCYTE ESTERASE UR QL STRIP: ABNORMAL
LYMPHOCYTES # BLD AUTO: 2.9 K/UL (ref 1–4.8)
LYMPHOCYTES NFR BLD: 43.5 % (ref 18–48)
MCH RBC QN AUTO: 27.8 PG (ref 27–31)
MCHC RBC AUTO-ENTMCNC: 32.6 G/DL (ref 32–36)
MCV RBC AUTO: 85 FL (ref 82–98)
MICROSCOPIC COMMENT: ABNORMAL
MONOCYTES # BLD AUTO: 0.5 K/UL (ref 0.3–1)
MONOCYTES NFR BLD: 7.5 % (ref 4–15)
NEUTROPHILS # BLD AUTO: 3.1 K/UL (ref 1.8–7.7)
NEUTROPHILS NFR BLD: 46.5 % (ref 38–73)
NITRITE UR QL STRIP: NEGATIVE
NRBC BLD-RTO: 0 /100 WBC
PH UR STRIP: 8 [PH] (ref 5–8)
PLATELET # BLD AUTO: 317 K/UL (ref 150–350)
PMV BLD AUTO: 9.1 FL (ref 9.2–12.9)
POCT GLUCOSE: 97 MG/DL (ref 70–110)
POTASSIUM SERPL-SCNC: 3.8 MMOL/L (ref 3.5–5.1)
PROT SERPL-MCNC: 8.1 G/DL (ref 6–8.4)
PROT UR QL STRIP: NEGATIVE
RBC # BLD AUTO: 4.53 M/UL (ref 4–5.4)
SODIUM SERPL-SCNC: 138 MMOL/L (ref 136–145)
SP GR UR STRIP: 1.01 (ref 1–1.03)
SQUAMOUS #/AREA URNS HPF: 20 /HPF
URN SPEC COLLECT METH UR: ABNORMAL
UROBILINOGEN UR STRIP-ACNC: NEGATIVE EU/DL
WBC # BLD AUTO: 6.66 K/UL (ref 3.9–12.7)
WBC #/AREA URNS HPF: 6 /HPF (ref 0–5)

## 2020-09-03 PROCEDURE — 63600175 PHARM REV CODE 636 W HCPCS: Performed by: EMERGENCY MEDICINE

## 2020-09-03 PROCEDURE — 81000 URINALYSIS NONAUTO W/SCOPE: CPT

## 2020-09-03 PROCEDURE — 96375 TX/PRO/DX INJ NEW DRUG ADDON: CPT

## 2020-09-03 PROCEDURE — 80053 COMPREHEN METABOLIC PANEL: CPT

## 2020-09-03 PROCEDURE — 85025 COMPLETE CBC W/AUTO DIFF WBC: CPT

## 2020-09-03 PROCEDURE — 96374 THER/PROPH/DIAG INJ IV PUSH: CPT

## 2020-09-03 PROCEDURE — 81025 URINE PREGNANCY TEST: CPT | Performed by: NURSE PRACTITIONER

## 2020-09-03 PROCEDURE — 25000003 PHARM REV CODE 250: Performed by: EMERGENCY MEDICINE

## 2020-09-03 PROCEDURE — 99284 EMERGENCY DEPT VISIT MOD MDM: CPT | Mod: 25

## 2020-09-03 PROCEDURE — 82962 GLUCOSE BLOOD TEST: CPT

## 2020-09-03 RX ORDER — AMITRIPTYLINE HYDROCHLORIDE 25 MG/1
25 TABLET, FILM COATED ORAL NIGHTLY PRN
COMMUNITY

## 2020-09-03 RX ORDER — DIPHENHYDRAMINE HYDROCHLORIDE 50 MG/ML
25 INJECTION INTRAMUSCULAR; INTRAVENOUS
Status: COMPLETED | OUTPATIENT
Start: 2020-09-03 | End: 2020-09-03

## 2020-09-03 RX ORDER — KETOROLAC TROMETHAMINE 30 MG/ML
15 INJECTION, SOLUTION INTRAMUSCULAR; INTRAVENOUS
Status: COMPLETED | OUTPATIENT
Start: 2020-09-03 | End: 2020-09-03

## 2020-09-03 RX ORDER — PROCHLORPERAZINE EDISYLATE 5 MG/ML
10 INJECTION INTRAMUSCULAR; INTRAVENOUS
Status: COMPLETED | OUTPATIENT
Start: 2020-09-03 | End: 2020-09-03

## 2020-09-03 RX ADMIN — DIPHENHYDRAMINE HYDROCHLORIDE 25 MG: 50 INJECTION, SOLUTION INTRAMUSCULAR; INTRAVENOUS at 10:09

## 2020-09-03 RX ADMIN — PROCHLORPERAZINE EDISYLATE 10 MG: 5 INJECTION INTRAMUSCULAR; INTRAVENOUS at 10:09

## 2020-09-03 RX ADMIN — KETOROLAC TROMETHAMINE 15 MG: 30 INJECTION, SOLUTION INTRAMUSCULAR at 10:09

## 2020-09-03 RX ADMIN — SODIUM CHLORIDE 1000 ML: 0.9 INJECTION, SOLUTION INTRAVENOUS at 10:09

## 2020-09-04 NOTE — FIRST PROVIDER EVALUATION
Emergency Department TeleTriage Encounter Note      CHIEF COMPLAINT    Chief Complaint   Patient presents with    Weakness     took somatriptoin for the first time today and it mad eher feel worse.     Migraine       VITAL SIGNS   Initial Vitals [09/03/20 2105]   BP Pulse Resp Temp SpO2   125/73 76 18 98.1 °F (36.7 °C) 100 %      MAP       --            ALLERGIES    Review of patient's allergies indicates:  No Known Allergies    PROVIDER TRIAGE NOTE  This is a teletriage evaluation of a 29 y.o. female presenting to the ED with c/o migraine headache - took dose of sumatriptan and reports feeling weak since. Initial orders will be placed and care will be transferred to an alternate provider when patient is roomed for a full evaluation. Any additional orders and the final disposition will be determined by that provider.         ORDERS  Labs Reviewed   POCT URINE PREGNANCY   POCT GLUCOSE MONITORING CONTINUOUS       ED Orders (720h ago, onward)    Start Ordered     Status Ordering Provider    09/03/20 2120 09/03/20 2119  POCT urine pregnancy  Once      Ordered BARRON LOFTON    09/03/20 2120 09/03/20 2119  POCT glucose  Once      Ordered BARRON LOFTON    09/03/20 2120 09/03/20 2119  Orthostatic blood pressure  Once      Ordered BARRON LOFTON            Virtual Visit Note: The provider triage portion of this emergency department evaluation and documentation was performed via [employer], a HIPAA-compliant telemedicine application, in concert with a tele-presenter in the room. A face to face patient evaluation with one of my colleagues will occur once the patient is placed in an emergency department room.      DISCLAIMER: This note was prepared with [employer] voice recognition transcription software. Garbled syntax, mangled pronouns, and other bizarre constructions may be attributed to that software system.

## 2020-09-04 NOTE — ED TRIAGE NOTES
Pt to ed c/o HA that has been constant since earlier today. She states starting sumatriptan prescribed by her neurologist where shortly after she started to have light sensitivity with dizziness. She states having a throbbing HA, mostly temporal. She denies any current n/v/d, fever or chills. Pt AAOx4, resp pattern even and non labored.

## 2020-09-04 NOTE — ED PROVIDER NOTES
Encounter Date: 9/3/2020    SCRIBE #1 NOTE: I, May Silvio, am scribing for, and in the presence of, Dr. Rogers.       History     Chief Complaint   Patient presents with    Weakness     took somatriptoin for the first time today and it mad eher feel worse.     Migraine     Time seen by provider: 10:00 PM    This is a 29 y.o. female who presents with complaint of headache which began this afternoon.  She reports a mild headache which worsened after taking 1st dose of sumatriptan.  Patient states she was prescribed sumatriptan earlier this week at pain management when she had her initial appointment for frequent headaches..  Headache is located in the frontal area and left temple, it is not her 1st or worst.  Headache is rated 10/10 and described as dull throbbing.  There is associated nausea but no vomiting, no vision change, no neck pain, no numbness or tingling.  There is associated photophobia and phonophobia.  Patient states she became very weak once headache worsened, and feeling fatigued which prompted her to come to the ED.    The history is provided by the patient.     Review of patient's allergies indicates:  No Known Allergies  History reviewed. No pertinent past medical history.  Past Surgical History:   Procedure Laterality Date    DILATION AND CURETTAGE OF UTERUS      VAGINAL DELIVERY  12/19/2018     Family History   Problem Relation Age of Onset    No Known Problems Mother     No Known Problems Father     No Known Problems Maternal Aunt     No Known Problems Paternal Aunt     No Known Problems Maternal Grandmother     No Known Problems Paternal Grandmother      Social History     Tobacco Use    Smoking status: Never Smoker    Smokeless tobacco: Never Used   Substance Use Topics    Alcohol use: No     Frequency: Never    Drug use: No     Review of Systems   Constitutional: Negative for chills and fever.   HENT: Negative for congestion and sore throat.    Eyes: Negative for visual  disturbance.   Respiratory: Negative for cough and shortness of breath.    Cardiovascular: Negative for chest pain and palpitations.   Gastrointestinal: Negative for abdominal pain, diarrhea and vomiting.   Genitourinary: Negative for decreased urine volume, dysuria and vaginal discharge.   Musculoskeletal: Negative for joint swelling, neck pain and neck stiffness.   Skin: Negative for rash and wound.   Neurological: Positive for weakness and headaches. Negative for numbness.   Psychiatric/Behavioral: Negative for confusion.       Physical Exam     Initial Vitals [09/03/20 2105]   BP Pulse Resp Temp SpO2   125/73 76 18 98.1 °F (36.7 °C) 100 %      MAP       --         Physical Exam    Nursing note and vitals reviewed.  Constitutional: She appears well-developed and well-nourished. She appears distressed.   Tearful.   HENT:   Head: Normocephalic and atraumatic.   Mouth/Throat: Oropharynx is clear and moist.   Eyes: Conjunctivae and EOM are normal. Pupils are equal, round, and reactive to light.   Neck: Normal range of motion. Neck supple.   Cardiovascular: Normal rate, regular rhythm and normal heart sounds.   No murmur heard.  Pulmonary/Chest: Breath sounds normal. No respiratory distress. She has no wheezes. She has no rhonchi. She has no rales.   Abdominal: Soft. Bowel sounds are normal. There is no abdominal tenderness.   Musculoskeletal: Normal range of motion.   Neurological: She is alert and oriented to person, place, and time. She has normal strength. No cranial nerve deficit or sensory deficit. GCS score is 15. GCS eye subscore is 4. GCS verbal subscore is 5. GCS motor subscore is 6.   Normal gait.  Normal finger-to-nose task ability.   Skin: Skin is warm and dry. No rash noted.   Psychiatric: She has a normal mood and affect. Her behavior is normal.         ED Course   Procedures  Labs Reviewed   CBC W/ AUTO DIFFERENTIAL - Abnormal; Notable for the following components:       Result Value    MPV 9.1 (*)      All other components within normal limits   COMPREHENSIVE METABOLIC PANEL - Abnormal; Notable for the following components:    Alkaline Phosphatase 48 (*)     All other components within normal limits   URINALYSIS, REFLEX TO URINE CULTURE - Abnormal; Notable for the following components:    Leukocytes, UA 1+ (*)     All other components within normal limits    Narrative:     Specimen Source->Urine   URINALYSIS MICROSCOPIC - Abnormal; Notable for the following components:    WBC, UA 6 (*)     Bacteria Few (*)     All other components within normal limits    Narrative:     Specimen Source->Urine   POCT URINE PREGNANCY   POCT GLUCOSE          Imaging Results    None          Medical Decision Making:   History:   Old Medical Records: I decided to obtain old medical records.  Clinical Tests:   Lab Tests: Ordered and Reviewed  ED Management:  Emergent evaluation a 29-year-old female who presents with complaint of headache, history of frequent headaches.  Patient is concerned because headache worsened after taking sumatriptan, 1st dose.  Vital signs are benign, afebrile.  Physical exam reveals no neurologic deficit.  She was treated with IV fluids and nonnarcotic headache cocktail with complete resolution in headache.  I do not suspect subarachnoid hemorrhage or meningitis or emergent cause of headache.  Patient is being worked up as an outpatient for this.  She has no neurologic deficits and does not require emergent imaging.  She is discharged in good condition and encouraged close follow-up with her PCP or to return for any new or worsening.            Scribe Attestation:   Scribe #1: I performed the above scribed service and the documentation accurately describes the services I performed. I attest to the accuracy of the note.    Attending Attestation:           Physician Attestation for Scribe:  Physician Attestation Statement for Scribe #1: I, Dr. Rogers, reviewed documentation, as scribed by May Anguiano in my  presence, and it is both accurate and complete.                               Clinical Impression:     1. Headache, unspecified headache type    2. Weakness                ED Disposition Condition    Discharge Stable        ED Prescriptions     None        Follow-up Information     Follow up With Specialties Details Why Contact Info    Your regular primary care doctor  Schedule an appointment as soon as possible for a visit       [facility] Emergency Medicine  As needed, If symptoms worsen [address] [id number]  [phone]                                     Ashlee Rogers MD  09/04/20 0402

## 2020-11-11 ENCOUNTER — HOSPITAL ENCOUNTER (EMERGENCY)
Facility: OTHER | Age: 30
Discharge: HOME | End: 2020-11-12
Attending: EMERGENCY MEDICINE
Payer: MEDICAID

## 2020-11-11 DIAGNOSIS — R51.9 ACUTE NONINTRACTABLE HEADACHE, UNSPECIFIED HEADACHE TYPE: Primary | ICD-10-CM

## 2020-11-11 PROCEDURE — 99284 EMERGENCY DEPT VISIT MOD MDM: CPT | Mod: 25

## 2020-11-11 PROCEDURE — 96372 THER/PROPH/DIAG INJ SC/IM: CPT

## 2020-11-11 PROCEDURE — 63600175 PHARM REV CODE 636 W HCPCS: Performed by: EMERGENCY MEDICINE

## 2020-11-11 RX ORDER — SUMATRIPTAN SUCCINATE 100 MG/1
100 TABLET ORAL
COMMUNITY

## 2020-11-11 RX ORDER — METOCLOPRAMIDE HYDROCHLORIDE 5 MG/ML
10 INJECTION INTRAMUSCULAR; INTRAVENOUS
Status: COMPLETED | OUTPATIENT
Start: 2020-11-11 | End: 2020-11-11

## 2020-11-11 RX ORDER — KETOROLAC TROMETHAMINE 30 MG/ML
30 INJECTION, SOLUTION INTRAMUSCULAR; INTRAVENOUS
Status: COMPLETED | OUTPATIENT
Start: 2020-11-11 | End: 2020-11-11

## 2020-11-11 RX ADMIN — METOCLOPRAMIDE 10 MG: 5 INJECTION, SOLUTION INTRAMUSCULAR; INTRAVENOUS at 11:11

## 2020-11-11 RX ADMIN — KETOROLAC TROMETHAMINE 30 MG: 30 INJECTION, SOLUTION INTRAMUSCULAR at 11:11

## 2020-11-12 VITALS
HEIGHT: 65 IN | SYSTOLIC BLOOD PRESSURE: 118 MMHG | BODY MASS INDEX: 23.32 KG/M2 | HEART RATE: 78 BPM | TEMPERATURE: 99 F | DIASTOLIC BLOOD PRESSURE: 80 MMHG | OXYGEN SATURATION: 100 % | WEIGHT: 140 LBS | RESPIRATION RATE: 16 BRPM

## 2020-11-12 RX ORDER — BUTALBITAL, ACETAMINOPHEN AND CAFFEINE 50; 325; 40 MG/1; MG/1; MG/1
1 TABLET ORAL EVERY 4 HOURS PRN
Qty: 15 TABLET | Refills: 0 | Status: SHIPPED | OUTPATIENT
Start: 2020-11-12 | End: 2020-12-12

## 2020-11-12 NOTE — ED PROVIDER NOTES
"Encounter Date: 11/11/2020    SCRIBE #1 NOTE: Adam LIANG, am scribing for, and in the presence of, Dr. Rivear.       History     Chief Complaint   Patient presents with    Migraine     Reports "constant" migraine since february. Reports no relief with Rx medications      Time seen by provider: 11:51 AM    This is a 30 y.o. female, with a hx of migraine headaches, who presents with complaint of a migraine. Pt states she began experiencing constant migraines about 9 months ago. She states she was seen by neurology and started on Imitrex, with no relief. She states the current migraine is 10/10. She states the migraine is exacerbated with sound and light. She denies nausea.    The history is provided by the patient and medical records.     Review of patient's allergies indicates:  No Known Allergies  Past Medical History:   Diagnosis Date    Migraine headache      Past Surgical History:   Procedure Laterality Date    DILATION AND CURETTAGE OF UTERUS      VAGINAL DELIVERY  12/19/2018     Family History   Problem Relation Age of Onset    No Known Problems Mother     No Known Problems Father     No Known Problems Maternal Aunt     No Known Problems Paternal Aunt     No Known Problems Maternal Grandmother     No Known Problems Paternal Grandmother      Social History     Tobacco Use    Smoking status: Never Smoker    Smokeless tobacco: Never Used   Substance Use Topics    Alcohol use: No     Frequency: Never    Drug use: No     Review of Systems   Constitutional: Negative for chills and fever.   HENT: Negative for congestion, rhinorrhea and sore throat.    Eyes: Positive for photophobia. Negative for visual disturbance.   Respiratory: Negative for cough and shortness of breath.    Cardiovascular: Negative for chest pain.   Gastrointestinal: Negative for abdominal pain, diarrhea, nausea and vomiting.   Genitourinary: Negative for dysuria.   Musculoskeletal: Negative for back pain.   Skin: Negative " for rash.   Neurological: Positive for headaches. Negative for dizziness, weakness and light-headedness.   Psychiatric/Behavioral: Negative for confusion.       Physical Exam     Initial Vitals [11/11/20 2145]   BP Pulse Resp Temp SpO2   114/84 75 18 98.7 °F (37.1 °C) 100 %      MAP       --         Physical Exam    Nursing note and vitals reviewed.  Constitutional: She appears well-developed and well-nourished.  Non-toxic appearance. She does not have a sickly appearance. No distress.   HENT:   Head: Normocephalic and atraumatic.   Nose: Nose normal.   Mouth/Throat: Oropharynx is clear and moist.   Eyes: Conjunctivae, EOM and lids are normal. Pupils are equal, round, and reactive to light. Right eye exhibits no nystagmus. Left eye exhibits no nystagmus.   Neck: Trachea normal, normal range of motion and phonation normal. Neck supple. No stridor present.   Cardiovascular: Normal rate, regular rhythm, normal heart sounds and normal pulses. Exam reveals no gallop and no friction rub.    No murmur heard.  Pulmonary/Chest: Breath sounds normal. No respiratory distress. She has no wheezes. She has no rhonchi. She has no rales.   Abdominal: Soft. Normal appearance and bowel sounds are normal. She exhibits no distension. There is no abdominal tenderness. There is no rigidity, no rebound, no guarding, no tenderness at McBurney's point and negative Olivares's sign.   Musculoskeletal: No tenderness or edema.   Neurological: She is alert and oriented to person, place, and time. She has normal strength and normal reflexes. She displays normal reflexes. No cranial nerve deficit or sensory deficit. She displays a negative Romberg sign. GCS eye subscore is 4. GCS verbal subscore is 5. GCS motor subscore is 6.   Skin: Skin is warm, dry and intact. Capillary refill takes less than 2 seconds. No rash noted. No pallor.   Psychiatric: Her speech is normal and behavior is normal.         ED Course   Procedures  Labs Reviewed   POCT URINE  PREGNANCY          Imaging Results    None          Medical Decision Making:   History:   Old Medical Records: I decided to obtain old medical records.  Old Records Summarized: records from previous admission(s).       <> Summary of Records: Patient seen June 1, 2020 for headache.  Also seen 09/03/2020 for headache.  Had a CT brain in June showing no acute abnormalities.   Blood work was also done on both visits in June and September showing no acute abnormalities.  Initial Assessment:   I believe the patient has an unspecified headache based upon the history and physical exam.  Likely musculoskeletal.  The patient has no focal weakness, numbness, meningismus, other focal neurologic deficit, history of trauma, fevers, elevated blood pressure to suggest meningitis, subarachnoid hemorrhage, TIA, stroke, mass, or hypertensive urgency. I do not feel a CT of the brain or blood work are necessary at this time.  Will treat with Reglan, Toradol and Fioricet.  Clinical Tests:   Lab Tests: Ordered and Reviewed            Scribe Attestation:   Scribe #1: I performed the above scribed service and the documentation accurately describes the services I performed. I attest to the accuracy of the note.    Attending Attestation:           Physician Attestation for Scribe:  Physician Attestation Statement for Scribe #1: I, Dr. Rivera, reviewed documentation, as scribed by Adam Edwards in my presence, and it is both accurate and complete.                 ED Course as of Nov 12 0027   Thu Nov 12, 2020   0024 On re-evaluation patient is feeling better.  Do not feel any further workup is indicated.  Will discharge with prescription for Fioricet aunt a ambulatory referral to Neurology.    Patient discharged home in stable condition. Diagnosis and treatment plan explained to patient and/or family member who is at bedside. I have answered all questions and the patient is satisfied with the plan of care. Strict return precautions  given. The patient demonstrates understanding of the care plan. This is the extent to the patients complaints today here in the emergency department.    [SM]      ED Course User Index  [SM] Marcos Rivera DO            Clinical Impression:     ICD-10-CM ICD-9-CM   1. Acute nonintractable headache, unspecified headache type  R51.9 784.0                          ED Disposition Condition    Discharge Stable        ED Prescriptions     Medication Sig Dispense Start Date End Date Auth. Provider    butalbital-acetaminophen-caffeine -40 mg (FIORICET, ESGIC) -40 mg per tablet Take 1 tablet by mouth every 4 (four) hours as needed for Pain. 15 tablet 11/12/2020 12/12/2020 Marcos Rivera DO        Follow-up Information     Follow up With Specialties Details Why Contact Info    your neurologist or neuro with [facility], DO  11/12/20 0027

## 2020-11-12 NOTE — FIRST PROVIDER EVALUATION
" Emergency Department TeleTriage Encounter Note      CHIEF COMPLAINT    Chief Complaint   Patient presents with    Migraine     Reports "constant" migraine since february. Reports no relief with Rx medications        VITAL SIGNS   Initial Vitals [11/11/20 2145]   BP Pulse Resp Temp SpO2   114/84 75 18 98.7 °F (37.1 °C) 100 %      MAP       --            ALLERGIES    Review of patient's allergies indicates:  No Known Allergies    PROVIDER TRIAGE NOTE  This is a teletriage evaluation of a 30 y.o. female presenting to the ED with c/o migrane headaches for several months. Sees neuro and they are adjusting medications.  Initial orders will be placed and care will be transferred to an alternate provider when patient is roomed for a full evaluation. Any additional orders and the final disposition will be determined by that provider.         ORDERS  Labs Reviewed   POCT URINE PREGNANCY       ED Orders (720h ago, onward)    Start Ordered     Status Ordering Provider    11/11/20 2231 11/11/20 2231  POCT urine pregnancy  Once      Ordered BARRON LOFTON            Virtual Visit Note: The provider triage portion of this emergency department evaluation and documentation was performed via [employer], a HIPAA-compliant telemedicine application, in concert with a tele-presenter in the room. A face to face patient evaluation with one of my colleagues will occur once the patient is placed in an emergency department room.      DISCLAIMER: This note was prepared with [employer] voice recognition transcription software. Garbled syntax, mangled pronouns, and other bizarre constructions may be attributed to that software system.  "

## 2020-11-12 NOTE — ED TRIAGE NOTES
Pt presents to ED via POV with c/o constant, frontal pressure-like headache since February with photophobia and sound. Pt reports seeing Neurology appx 3.5 weeks for this. Pt ws given Sumatriptan but states has not been taking because it makes her lethargic as well as having her amitriptyline increased without improvement.. Denies visual disturbances. Hx migraines.

## 2020-11-16 ENCOUNTER — TELEPHONE (OUTPATIENT)
Dept: INTERNAL MEDICINE | Facility: CLINIC | Age: 30
End: 2020-11-16

## 2020-11-27 ENCOUNTER — HOSPITAL ENCOUNTER (EMERGENCY)
Facility: HOSPITAL | Age: 30
Discharge: LEFT BEFORE DISCHARGE | End: 2020-11-27
Payer: MEDICAID

## 2020-11-27 VITALS
SYSTOLIC BLOOD PRESSURE: 96 MMHG | HEART RATE: 88 BPM | TEMPERATURE: 98 F | HEIGHT: 65 IN | RESPIRATION RATE: 18 BRPM | WEIGHT: 140 LBS | BODY MASS INDEX: 23.32 KG/M2 | DIASTOLIC BLOOD PRESSURE: 64 MMHG | OXYGEN SATURATION: 100 %

## 2020-11-27 LAB
ALBUMIN SERPL BCP-MCNC: 4.2 G/DL (ref 3.5–5.2)
ALP SERPL-CCNC: 50 U/L (ref 55–135)
ALT SERPL W/O P-5'-P-CCNC: 21 U/L (ref 10–44)
ANION GAP SERPL CALC-SCNC: 7 MMOL/L (ref 8–16)
AST SERPL-CCNC: 25 U/L (ref 10–40)
B-HCG UR QL: NEGATIVE
BASOPHILS NFR BLD: 0 % (ref 0–1.9)
BILIRUB SERPL-MCNC: 0.6 MG/DL (ref 0.1–1)
BILIRUB UR QL STRIP: NEGATIVE
BUN SERPL-MCNC: 7 MG/DL (ref 6–20)
CALCIUM SERPL-MCNC: 9.1 MG/DL (ref 8.7–10.5)
CHLORIDE SERPL-SCNC: 104 MMOL/L (ref 95–110)
CLARITY UR: ABNORMAL
CO2 SERPL-SCNC: 28 MMOL/L (ref 23–29)
COLOR UR: YELLOW
CREAT SERPL-MCNC: 0.8 MG/DL (ref 0.5–1.4)
CTP QC/QA: YES
DIFFERENTIAL METHOD: ABNORMAL
EOSINOPHIL NFR BLD: 1 % (ref 0–8)
ERYTHROCYTE [DISTWIDTH] IN BLOOD BY AUTOMATED COUNT: 11.8 % (ref 11.5–14.5)
EST. GFR  (AFRICAN AMERICAN): >60 ML/MIN/1.73 M^2
EST. GFR  (NON AFRICAN AMERICAN): >60 ML/MIN/1.73 M^2
GLUCOSE SERPL-MCNC: 72 MG/DL (ref 70–110)
GLUCOSE UR QL STRIP: NEGATIVE
HCT VFR BLD AUTO: 37.6 % (ref 37–48.5)
HGB BLD-MCNC: 12.4 G/DL (ref 12–16)
HGB UR QL STRIP: NEGATIVE
IMM GRANULOCYTES # BLD AUTO: ABNORMAL K/UL (ref 0–0.04)
IMM GRANULOCYTES NFR BLD AUTO: ABNORMAL % (ref 0–0.5)
KETONES UR QL STRIP: NEGATIVE
LEUKOCYTE ESTERASE UR QL STRIP: NEGATIVE
LIPASE SERPL-CCNC: 25 U/L (ref 4–60)
LYMPHOCYTES NFR BLD: 50 % (ref 18–48)
MCH RBC QN AUTO: 28.4 PG (ref 27–31)
MCHC RBC AUTO-ENTMCNC: 33 G/DL (ref 32–36)
MCV RBC AUTO: 86 FL (ref 82–98)
MONOCYTES NFR BLD: 10 % (ref 4–15)
MYELOCYTES NFR BLD MANUAL: 1 %
NEUTROPHILS NFR BLD: 31 % (ref 38–73)
NEUTS BAND NFR BLD MANUAL: 7 %
NITRITE UR QL STRIP: NEGATIVE
NRBC BLD-RTO: 0 /100 WBC
PH UR STRIP: 7 [PH] (ref 5–8)
PLATELET # BLD AUTO: 295 K/UL (ref 150–350)
PLATELET BLD QL SMEAR: ABNORMAL
PMV BLD AUTO: 9.4 FL (ref 9.2–12.9)
POTASSIUM SERPL-SCNC: 3.8 MMOL/L (ref 3.5–5.1)
PROT SERPL-MCNC: 8.3 G/DL (ref 6–8.4)
PROT UR QL STRIP: NEGATIVE
RBC # BLD AUTO: 4.37 M/UL (ref 4–5.4)
SODIUM SERPL-SCNC: 139 MMOL/L (ref 136–145)
SP GR UR STRIP: 1.01 (ref 1–1.03)
URN SPEC COLLECT METH UR: ABNORMAL
UROBILINOGEN UR STRIP-ACNC: NEGATIVE EU/DL
WBC # BLD AUTO: 3.3 K/UL (ref 3.9–12.7)

## 2020-11-27 PROCEDURE — 85007 BL SMEAR W/DIFF WBC COUNT: CPT

## 2020-11-27 PROCEDURE — 80053 COMPREHEN METABOLIC PANEL: CPT

## 2020-11-27 PROCEDURE — 81025 URINE PREGNANCY TEST: CPT | Performed by: EMERGENCY MEDICINE

## 2020-11-27 PROCEDURE — 99900041 HC LEFT WITHOUT BEING SEEN- EMERGENCY

## 2020-11-27 PROCEDURE — 85027 COMPLETE CBC AUTOMATED: CPT

## 2020-11-27 PROCEDURE — 36415 COLL VENOUS BLD VENIPUNCTURE: CPT

## 2020-11-27 PROCEDURE — 83690 ASSAY OF LIPASE: CPT

## 2020-11-27 PROCEDURE — 81003 URINALYSIS AUTO W/O SCOPE: CPT

## 2020-12-15 ENCOUNTER — HOSPITAL ENCOUNTER (EMERGENCY)
Facility: OTHER | Age: 30
Discharge: HOME | End: 2020-12-15
Attending: EMERGENCY MEDICINE
Payer: MEDICAID

## 2020-12-15 VITALS
HEART RATE: 82 BPM | WEIGHT: 144 LBS | TEMPERATURE: 98 F | SYSTOLIC BLOOD PRESSURE: 109 MMHG | OXYGEN SATURATION: 100 % | RESPIRATION RATE: 18 BRPM | DIASTOLIC BLOOD PRESSURE: 67 MMHG | BODY MASS INDEX: 23.96 KG/M2

## 2020-12-15 DIAGNOSIS — R59.0 CERVICAL LYMPHADENOPATHY: ICD-10-CM

## 2020-12-15 DIAGNOSIS — R51.9 CHRONIC NONINTRACTABLE HEADACHE, UNSPECIFIED HEADACHE TYPE: ICD-10-CM

## 2020-12-15 DIAGNOSIS — N93.8 DYSFUNCTIONAL UTERINE BLEEDING: ICD-10-CM

## 2020-12-15 DIAGNOSIS — J02.9 VIRAL PHARYNGITIS: Primary | ICD-10-CM

## 2020-12-15 DIAGNOSIS — G89.29 CHRONIC NONINTRACTABLE HEADACHE, UNSPECIFIED HEADACHE TYPE: ICD-10-CM

## 2020-12-15 LAB
ALBUMIN SERPL BCP-MCNC: 4.3 G/DL (ref 3.5–5.2)
ALP SERPL-CCNC: 55 U/L (ref 55–135)
ALT SERPL W/O P-5'-P-CCNC: 18 U/L (ref 10–44)
ANION GAP SERPL CALC-SCNC: 10 MMOL/L (ref 8–16)
AST SERPL-CCNC: 24 U/L (ref 10–40)
B-HCG UR QL: NEGATIVE
BACTERIA #/AREA URNS HPF: ABNORMAL /HPF
BASOPHILS # BLD AUTO: 0 K/UL (ref 0–0.2)
BASOPHILS NFR BLD: 0 % (ref 0–1.9)
BILIRUB SERPL-MCNC: 0.7 MG/DL (ref 0.1–1)
BILIRUB UR QL STRIP: NEGATIVE
BUN SERPL-MCNC: 8 MG/DL (ref 6–20)
CALCIUM SERPL-MCNC: 9 MG/DL (ref 8.7–10.5)
CHLORIDE SERPL-SCNC: 102 MMOL/L (ref 95–110)
CLARITY UR: CLEAR
CO2 SERPL-SCNC: 27 MMOL/L (ref 23–29)
COLOR UR: YELLOW
CREAT SERPL-MCNC: 0.8 MG/DL (ref 0.5–1.4)
CTP QC/QA: YES
DIFFERENTIAL METHOD: ABNORMAL
EOSINOPHIL # BLD AUTO: 0 K/UL (ref 0–0.5)
EOSINOPHIL NFR BLD: 0 % (ref 0–8)
ERYTHROCYTE [DISTWIDTH] IN BLOOD BY AUTOMATED COUNT: 12.2 % (ref 11.5–14.5)
EST. GFR  (AFRICAN AMERICAN): >60 ML/MIN/1.73 M^2
EST. GFR  (NON AFRICAN AMERICAN): >60 ML/MIN/1.73 M^2
GLUCOSE SERPL-MCNC: 88 MG/DL (ref 70–110)
GLUCOSE UR QL STRIP: NEGATIVE
GROUP A STREP, MOLECULAR: NEGATIVE
HCT VFR BLD AUTO: 36.5 % (ref 37–48.5)
HGB BLD-MCNC: 11.5 G/DL (ref 12–16)
HGB UR QL STRIP: ABNORMAL
HYPOCHROMIA BLD QL SMEAR: ABNORMAL
IMM GRANULOCYTES # BLD AUTO: 0.01 K/UL (ref 0–0.04)
IMM GRANULOCYTES NFR BLD AUTO: 0.4 % (ref 0–0.5)
INR PPP: 1.1 (ref 0.8–1.2)
KETONES UR QL STRIP: NEGATIVE
LEUKOCYTE ESTERASE UR QL STRIP: ABNORMAL
LYMPHOCYTES # BLD AUTO: 1.2 K/UL (ref 1–4.8)
LYMPHOCYTES NFR BLD: 42.6 % (ref 18–48)
MCH RBC QN AUTO: 26.3 PG (ref 27–31)
MCHC RBC AUTO-ENTMCNC: 31.5 G/DL (ref 32–36)
MCV RBC AUTO: 84 FL (ref 82–98)
MICROSCOPIC COMMENT: ABNORMAL
MONOCYTES # BLD AUTO: 0.3 K/UL (ref 0.3–1)
MONOCYTES NFR BLD: 10.7 % (ref 4–15)
NEUTROPHILS # BLD AUTO: 1.3 K/UL (ref 1.8–7.7)
NEUTROPHILS NFR BLD: 46.3 % (ref 38–73)
NITRITE UR QL STRIP: NEGATIVE
NRBC BLD-RTO: 0 /100 WBC
PH UR STRIP: 8 [PH] (ref 5–8)
PLATELET # BLD AUTO: 232 K/UL (ref 150–350)
PLATELET BLD QL SMEAR: ABNORMAL
PMV BLD AUTO: 9.4 FL (ref 9.2–12.9)
POTASSIUM SERPL-SCNC: 4.2 MMOL/L (ref 3.5–5.1)
PROT SERPL-MCNC: 8.7 G/DL (ref 6–8.4)
PROT UR QL STRIP: NEGATIVE
PROTHROMBIN TIME: 11.7 SEC (ref 9–12.5)
RBC # BLD AUTO: 4.37 M/UL (ref 4–5.4)
RBC #/AREA URNS HPF: 2 /HPF (ref 0–4)
SODIUM SERPL-SCNC: 139 MMOL/L (ref 136–145)
SP GR UR STRIP: 1.01 (ref 1–1.03)
SQUAMOUS #/AREA URNS HPF: 7 /HPF
URN SPEC COLLECT METH UR: ABNORMAL
UROBILINOGEN UR STRIP-ACNC: NEGATIVE EU/DL
WBC # BLD AUTO: 2.81 K/UL (ref 3.9–12.7)
WBC #/AREA URNS HPF: 6 /HPF (ref 0–5)

## 2020-12-15 PROCEDURE — 87651 STREP A DNA AMP PROBE: CPT

## 2020-12-15 PROCEDURE — 99284 EMERGENCY DEPT VISIT MOD MDM: CPT | Mod: 25

## 2020-12-15 PROCEDURE — 81025 URINE PREGNANCY TEST: CPT | Performed by: EMERGENCY MEDICINE

## 2020-12-15 PROCEDURE — 63600175 PHARM REV CODE 636 W HCPCS: Performed by: EMERGENCY MEDICINE

## 2020-12-15 PROCEDURE — 85025 COMPLETE CBC W/AUTO DIFF WBC: CPT

## 2020-12-15 PROCEDURE — 80053 COMPREHEN METABOLIC PANEL: CPT

## 2020-12-15 PROCEDURE — 96374 THER/PROPH/DIAG INJ IV PUSH: CPT

## 2020-12-15 PROCEDURE — 85610 PROTHROMBIN TIME: CPT

## 2020-12-15 PROCEDURE — 81000 URINALYSIS NONAUTO W/SCOPE: CPT

## 2020-12-15 PROCEDURE — 96375 TX/PRO/DX INJ NEW DRUG ADDON: CPT

## 2020-12-15 RX ORDER — DIPHENHYDRAMINE HYDROCHLORIDE 50 MG/ML
25 INJECTION INTRAMUSCULAR; INTRAVENOUS
Status: COMPLETED | OUTPATIENT
Start: 2020-12-15 | End: 2020-12-15

## 2020-12-15 RX ORDER — KETOROLAC TROMETHAMINE 30 MG/ML
15 INJECTION, SOLUTION INTRAMUSCULAR; INTRAVENOUS
Status: COMPLETED | OUTPATIENT
Start: 2020-12-15 | End: 2020-12-15

## 2020-12-15 RX ORDER — PROCHLORPERAZINE EDISYLATE 5 MG/ML
10 INJECTION INTRAMUSCULAR; INTRAVENOUS
Status: COMPLETED | OUTPATIENT
Start: 2020-12-15 | End: 2020-12-15

## 2020-12-15 RX ADMIN — DIPHENHYDRAMINE HYDROCHLORIDE 25 MG: 50 INJECTION, SOLUTION INTRAMUSCULAR; INTRAVENOUS at 01:12

## 2020-12-15 RX ADMIN — PROCHLORPERAZINE EDISYLATE 10 MG: 5 INJECTION INTRAMUSCULAR; INTRAVENOUS at 01:12

## 2020-12-15 RX ADMIN — KETOROLAC TROMETHAMINE 15 MG: 30 INJECTION, SOLUTION INTRAMUSCULAR at 01:12

## 2020-12-15 NOTE — FIRST PROVIDER EVALUATION
" Emergency Department TeleTriage Encounter Note      CHIEF COMPLAINT    Chief Complaint   Patient presents with    Vaginal Bleeding     x3 days. pt denies soaking pad <30 min. pt reports LMP 2 weeks ago     Headache     pt reports chronic headache since Feb. pt had botox on 12/10 done to help w migraines and reports ever since injections, pt has been having bilateral neck stiffness, pressure behind eyes. pt denies vision loss       VITAL SIGNS   Initial Vitals [12/15/20 1236]   BP Pulse Resp Temp SpO2   109/67 97 18 98.3 °F (36.8 °C) 100 %      MAP       --            ALLERGIES    Review of patient's allergies indicates:   Allergen Reactions    Maxalt [rizatriptan] Other (See Comments)     Pt reports " neck stiffness"          PROVIDER TRIAGE NOTE  The patient presents to the emergency department with 2 separate complaints.  The 1st is submandibular swelling and dysphagia along with some neck stiffness over the past 5 days.  She states that 5 days ago, she had Botox injections for chronic migraines.  She continues to have persistent headaches.  She denies fever or sinus congestion.  She denies shortness of breath or a cough.  Her 2nd complaint is heavy vaginal bleeding over the course of the past 3 days.  Her last menstrual period was just 2 weeks ago.  She is unclear of the etiology but states that she does have heavy menstrual cycles.  Associated with this, the patient states that she does have some weakness.      ORDERS  Labs Reviewed   THROAT SCREEN, RAPID   CBC W/ AUTO DIFFERENTIAL   COMPREHENSIVE METABOLIC PANEL   URINALYSIS, REFLEX TO URINE CULTURE   PROTIME-INR   POCT URINE PREGNANCY   SARS-COV-2 RDRP GENE       ED Orders (720h ago, onward)    Start Ordered     Status Ordering Provider    12/15/20 1242 12/15/20 1241  Rapid strep screen  STAT  Collect    Ordered RODO ARNDHAWA    12/15/20 1242 12/15/20 1241  POCT COVID-19 Rapid Screening  Once      Ordered RODO RANDHAWA    12/15/20 1241 12/15/20 1241  " Saline lock IV  Once      Ordered EDISRODO S.    12/15/20 1241 12/15/20 1241  CBC auto differential  STAT  Collect    Ordered EDIS, RODO S.    12/15/20 1241 12/15/20 1241  Comprehensive metabolic panel  STAT  Collect    Ordered EDIS RODO S.    12/15/20 1241 12/15/20 1241  Urinalysis, Reflex to Urine Culture Urine, Clean Catch  STAT      Ordered EDIS RODO S.    12/15/20 1241 12/15/20 1241  POCT urine pregnancy  Once      Ordered EDIS RODO S.    12/15/20 1241 12/15/20 1241  Protime-INR  STAT  Collect    Ordered RODO RANDHAWA            Virtual Visit Note: The provider triage portion of this emergency department evaluation and documentation was performed via [employer], a HIPAA-compliant telemedicine application, in concert with a tele-presenter in the room. A face to face patient evaluation with one of my colleagues will occur once the patient is placed in an emergency department room.      DISCLAIMER: This note was prepared with [employer] voice recognition transcription software. Garbled syntax, mangled pronouns, and other bizarre constructions may be attributed to that software system.

## 2020-12-15 NOTE — ED TRIAGE NOTES
Pt presents to the ED w/ c/o vaginal bleeding and headache.  Reports chronic headache x February and had Botox on 12/10 to treat headache.  Endorses neck stiffness and pressure behind eyes and fatigue.  Also reports vaginal bleeding x 3 days but us not saturating >1 pad per hour.  Denies CP, SOB, fever, chills, vision changes.

## 2020-12-15 NOTE — ED PROVIDER NOTES
"Encounter Date: 12/15/2020    SCRIBE #1 NOTE: I, May Silvio, am scribing for, and in the presence of, Dr. Rogers.       History     Chief Complaint   Patient presents with    Vaginal Bleeding     x3 days. pt denies soaking pad <30 min. pt reports LMP 2 weeks ago     Headache     pt reports chronic headache since Feb. pt had botox on 12/10 done to help w migraines and reports ever since injections, pt has been having bilateral neck stiffness, pressure behind eyes. pt denies vision loss     Time seen by provider: 1:14 PM    This is a 30 y.o. female with a history of migraine headaches who presents with complaint of persistent headache with associated fatigue, lymph node swelling in the neck, mild sore throat, and body aches since receiving Botox injections 5 days ago as headache treatment. She has received Botox injections once before by another provider previously which mildly improved her headaches. She states that Sumatriptan worsened her headache in the past. She has been prescribed Effexor recently by her neurologist, but does not want to take it. She has not tried keeping a food diary yet. She is requesting information about other neurologists to see.  She denies any recent fever, chills, cough, rhinorrhea, or N/V/D.     She also complains of vaginal bleeding for the past 3 days. She states that bleeding is light today. She is concerned that this is not her period, but admits to discontinuing Depo injections with last injection 4 months ago. She reports that she has had a normal menstrual period since then. She does not smoke cigarettes, drink alcohol heavily, or use illicit drugs. She denies additional complaints at this time.    The history is provided by the patient.     Review of patient's allergies indicates:   Allergen Reactions    Maxalt [rizatriptan] Other (See Comments)     Pt reports " neck stiffness"        Past Medical History:   Diagnosis Date    Migraine headache      Past Surgical History: "   Procedure Laterality Date    DILATION AND CURETTAGE OF UTERUS      VAGINAL DELIVERY  12/19/2018     Family History   Problem Relation Age of Onset    No Known Problems Mother     No Known Problems Father     No Known Problems Maternal Aunt     No Known Problems Paternal Aunt     No Known Problems Maternal Grandmother     No Known Problems Paternal Grandmother      Social History     Tobacco Use    Smoking status: Never Smoker    Smokeless tobacco: Never Used   Substance Use Topics    Alcohol use: No     Frequency: Never    Drug use: No     Review of Systems   Constitutional: Positive for fatigue. Negative for chills and fever.   HENT: Positive for sore throat. Negative for congestion, rhinorrhea and trouble swallowing.    Eyes: Negative for visual disturbance.   Respiratory: Negative for cough and shortness of breath.    Cardiovascular: Negative for chest pain and palpitations.   Gastrointestinal: Negative for abdominal pain, diarrhea, nausea and vomiting.   Genitourinary: Positive for vaginal bleeding. Negative for decreased urine volume, dysuria and vaginal discharge.   Musculoskeletal: Positive for myalgias. Negative for joint swelling.   Skin: Negative for rash and wound.   Neurological: Positive for headaches. Negative for weakness and numbness.   Hematological: Positive for adenopathy (neck).   Psychiatric/Behavioral: Negative for confusion.       Physical Exam     Initial Vitals [12/15/20 1236]   BP Pulse Resp Temp SpO2   109/67 97 18 98.3 °F (36.8 °C) 100 %      MAP       --         Physical Exam    Nursing note and vitals reviewed.  Constitutional: She appears well-developed and well-nourished. No distress.   HENT:   Head: Normocephalic and atraumatic.   Mouth/Throat: Oropharynx is clear and moist. No oropharyngeal exudate.   Tonsils enlarged, slightly erythematous. Uvula midline. No exudates.   Eyes: Conjunctivae and EOM are normal. Pupils are equal, round, and reactive to light.   Neck:  Normal range of motion. Neck supple.   Shoddy anterior cervical lymphadenopathy.   Cardiovascular: Normal rate, regular rhythm and normal heart sounds.   No murmur heard.  Pulmonary/Chest: Breath sounds normal. No respiratory distress. She has no wheezes. She has no rhonchi. She has no rales.   Abdominal: Soft. Bowel sounds are normal. There is no abdominal tenderness.   Musculoskeletal: Normal range of motion.   Lymphadenopathy:     She has cervical adenopathy.   Neurological: She is alert and oriented to person, place, and time. She has normal strength. No cranial nerve deficit or sensory deficit. GCS score is 15. GCS eye subscore is 4. GCS verbal subscore is 5. GCS motor subscore is 6.   Skin: Skin is warm and dry. No rash noted.   Psychiatric: She has a normal mood and affect. Her behavior is normal.         ED Course   Procedures  Labs Reviewed   CBC W/ AUTO DIFFERENTIAL - Abnormal; Notable for the following components:       Result Value    WBC 2.81 (*)     Hemoglobin 11.5 (*)     Hematocrit 36.5 (*)     MCH 26.3 (*)     MCHC 31.5 (*)     Gran # (ANC) 1.3 (*)     All other components within normal limits   COMPREHENSIVE METABOLIC PANEL - Abnormal; Notable for the following components:    Total Protein 8.7 (*)     All other components within normal limits   URINALYSIS, REFLEX TO URINE CULTURE - Abnormal; Notable for the following components:    Occult Blood UA 2+ (*)     Leukocytes, UA Trace (*)     All other components within normal limits    Narrative:     Specimen Source->Urine   URINALYSIS MICROSCOPIC - Abnormal; Notable for the following components:    WBC, UA 6 (*)     All other components within normal limits    Narrative:     Specimen Source->Urine   GROUP A STREP, MOLECULAR   PROTIME-INR   POCT URINE PREGNANCY             Medical Decision Making:   History:   Old Medical Records: I decided to obtain old medical records.  Clinical Tests:   Lab Tests: Ordered and Reviewed  ED Management:  Emergent  evaluation of 30-year-old female who presents with complaint of headache, history of chronic headaches.  Per medical record she has been seen here 4 times within the last 6 months for headache.  She admits that she is following up with a neurologist, but not following all of his recommendations.  Headache is not 1st or worst of life and I do not suspect meningitis.  She does report new symptoms of lymphadenopathy in the neck, sore throat, myalgias.  Rapid COVID test is negative.  On exam there is no evidence of peritonsillar abscess.  Rapid strep test is negative.  I suspect a viral syndrome.  Ultimately she was treated with a headache cocktail per her request which included Toradol Compazine and Benadryl.  She did have improvement.  Additional complaint was irregular vaginal bleeding which is not heavy.  There is no profound anemia or symptoms of anemia on blood testing and she is not pregnant.  I suspect this represents irregular bleeding due to withdrawal of Depo-Provera shot which have been recently discontinued.  Ultimately she is discharged in good condition but encouraged to continue follow-up with her neurologist, encouraged to take his Effexor which was prescribed, encouraged to keep food and symptom diary.  Patient requests a new neurologist, but has had difficulty due to Medicaid insurance.  She is encouraged to continue treatment with her existing neurologist and has a follow-up scheduled next month.  She was discharged in improved condition and advised to return for new or worsening symptoms.            Scribe Attestation:   Scribe #1: I performed the above scribed service and the documentation accurately describes the services I performed. I attest to the accuracy of the note.    Attending Attestation:           Physician Attestation for Scribe:  Physician Attestation Statement for Scribe #1: I, Dr. Rogers, reviewed documentation, as scribed by May Anguiano in my presence, and it is both accurate and  complete.                           Clinical Impression:     1. Viral pharyngitis    2. Cervical lymphadenopathy    3. Chronic nonintractable headache, unspecified headache type    4. Dysfunctional uterine bleeding            ED Disposition Condition    Discharge Stable        ED Prescriptions     None        Follow-up Information     Follow up With Specialties Details Why Contact Info    Your regular doctor and your regular neurologist  Schedule an appointment as soon as possible for a visit       [facility] Emergency [facility]  As needed, If symptoms worsen [address] [facility] [id number] [phone]                                       Ashlee Rogers MD  12/16/20 0943       Ashlee Rogers MD  12/16/20 0974

## 2020-12-18 ENCOUNTER — TELEPHONE (OUTPATIENT)
Dept: NEUROLOGY | Facility: CLINIC | Age: 30
End: 2020-12-18

## 2020-12-18 NOTE — TELEPHONE ENCOUNTER
----- Message from Emeli Valadez sent at 12/18/2020  9:28 AM CST -----  Regarding: Requesting an virtural appt for today in a lot of pain  Pt called to schedule an appt for neurology from a reaction from a botox injection. Pt has been to ER with swollen lymp nodes.  ER ask pt to follow up and please if possible needs virtual pt is in a lot of pain.  Pt has a referral.      Pt can be reached at [phone]      Thank you!

## 2020-12-22 ENCOUNTER — HOSPITAL ENCOUNTER (EMERGENCY)
Facility: HOSPITAL | Age: 30
Discharge: HOME | End: 2020-12-22
Attending: EMERGENCY MEDICINE
Payer: MEDICAID

## 2020-12-22 VITALS
HEIGHT: 65 IN | BODY MASS INDEX: 20.83 KG/M2 | WEIGHT: 125 LBS | HEART RATE: 96 BPM | TEMPERATURE: 98 F | SYSTOLIC BLOOD PRESSURE: 114 MMHG | RESPIRATION RATE: 18 BRPM | OXYGEN SATURATION: 100 % | DIASTOLIC BLOOD PRESSURE: 62 MMHG

## 2020-12-22 DIAGNOSIS — M79.10 MYALGIA: ICD-10-CM

## 2020-12-22 DIAGNOSIS — R50.9 FEVER, UNSPECIFIED FEVER CAUSE: Primary | ICD-10-CM

## 2020-12-22 DIAGNOSIS — G43.909 MIGRAINE WITHOUT STATUS MIGRAINOSUS, NOT INTRACTABLE, UNSPECIFIED MIGRAINE TYPE: ICD-10-CM

## 2020-12-22 DIAGNOSIS — R06.02 SHORTNESS OF BREATH: ICD-10-CM

## 2020-12-22 LAB
ALBUMIN SERPL BCP-MCNC: 4 G/DL (ref 3.5–5.2)
ALP SERPL-CCNC: 42 U/L (ref 55–135)
ALT SERPL W/O P-5'-P-CCNC: 18 U/L (ref 10–44)
ANION GAP SERPL CALC-SCNC: 12 MMOL/L (ref 8–16)
AST SERPL-CCNC: 34 U/L (ref 10–40)
B-HCG UR QL: NEGATIVE
BASOPHILS # BLD AUTO: 0.01 K/UL (ref 0–0.2)
BASOPHILS NFR BLD: 0.4 % (ref 0–1.9)
BILIRUB SERPL-MCNC: 0.7 MG/DL (ref 0.1–1)
BILIRUB UR QL STRIP: NEGATIVE
BUN SERPL-MCNC: 7 MG/DL (ref 6–20)
CALCIUM SERPL-MCNC: 8.7 MG/DL (ref 8.7–10.5)
CHLORIDE SERPL-SCNC: 100 MMOL/L (ref 95–110)
CLARITY UR: CLEAR
CO2 SERPL-SCNC: 23 MMOL/L (ref 23–29)
COLOR UR: YELLOW
CREAT SERPL-MCNC: 0.7 MG/DL (ref 0.5–1.4)
CTP QC/QA: YES
DIFFERENTIAL METHOD: ABNORMAL
EOSINOPHIL # BLD AUTO: 0 K/UL (ref 0–0.5)
EOSINOPHIL NFR BLD: 0 % (ref 0–8)
ERYTHROCYTE [DISTWIDTH] IN BLOOD BY AUTOMATED COUNT: 12.3 % (ref 11.5–14.5)
EST. GFR  (AFRICAN AMERICAN): >60 ML/MIN/1.73 M^2
EST. GFR  (NON AFRICAN AMERICAN): >60 ML/MIN/1.73 M^2
GLUCOSE SERPL-MCNC: 95 MG/DL (ref 70–110)
GLUCOSE UR QL STRIP: NEGATIVE
HCT VFR BLD AUTO: 34.9 % (ref 37–48.5)
HETEROPH AB SERPL QL IA: NEGATIVE
HGB BLD-MCNC: 11.1 G/DL (ref 12–16)
HGB UR QL STRIP: NEGATIVE
IMM GRANULOCYTES # BLD AUTO: 0.04 K/UL (ref 0–0.04)
IMM GRANULOCYTES NFR BLD AUTO: 1.4 % (ref 0–0.5)
INFLUENZA A, MOLECULAR: NEGATIVE
INFLUENZA B, MOLECULAR: NEGATIVE
KETONES UR QL STRIP: ABNORMAL
LEUKOCYTE ESTERASE UR QL STRIP: NEGATIVE
LIPASE SERPL-CCNC: 29 U/L (ref 4–60)
LYMPHOCYTES # BLD AUTO: 1 K/UL (ref 1–4.8)
LYMPHOCYTES NFR BLD: 34.3 % (ref 18–48)
MCH RBC QN AUTO: 26.3 PG (ref 27–31)
MCHC RBC AUTO-ENTMCNC: 31.8 G/DL (ref 32–36)
MCV RBC AUTO: 83 FL (ref 82–98)
MONOCYTES # BLD AUTO: 0.2 K/UL (ref 0.3–1)
MONOCYTES NFR BLD: 7.4 % (ref 4–15)
NEUTROPHILS # BLD AUTO: 1.6 K/UL (ref 1.8–7.7)
NEUTROPHILS NFR BLD: 56.5 % (ref 38–73)
NITRITE UR QL STRIP: NEGATIVE
NRBC BLD-RTO: 0 /100 WBC
PH UR STRIP: >8 [PH] (ref 5–8)
PLATELET # BLD AUTO: 250 K/UL (ref 150–350)
PMV BLD AUTO: 9.8 FL (ref 9.2–12.9)
POTASSIUM SERPL-SCNC: 3.9 MMOL/L (ref 3.5–5.1)
PROT SERPL-MCNC: 8.4 G/DL (ref 6–8.4)
PROT UR QL STRIP: NEGATIVE
RBC # BLD AUTO: 4.22 M/UL (ref 4–5.4)
SARS-COV-2 RDRP RESP QL NAA+PROBE: NEGATIVE
SODIUM SERPL-SCNC: 135 MMOL/L (ref 136–145)
SP GR UR STRIP: 1.01 (ref 1–1.03)
SPECIMEN SOURCE: NORMAL
URN SPEC COLLECT METH UR: ABNORMAL
UROBILINOGEN UR STRIP-ACNC: NEGATIVE EU/DL
WBC # BLD AUTO: 2.83 K/UL (ref 3.9–12.7)

## 2020-12-22 PROCEDURE — 93005 ELECTROCARDIOGRAM TRACING: CPT | Performed by: INTERNAL MEDICINE

## 2020-12-22 PROCEDURE — U0002 COVID-19 LAB TEST NON-CDC: HCPCS

## 2020-12-22 PROCEDURE — 83690 ASSAY OF LIPASE: CPT

## 2020-12-22 PROCEDURE — 99285 EMERGENCY DEPT VISIT HI MDM: CPT | Mod: 25

## 2020-12-22 PROCEDURE — 36415 COLL VENOUS BLD VENIPUNCTURE: CPT

## 2020-12-22 PROCEDURE — 81003 URINALYSIS AUTO W/O SCOPE: CPT

## 2020-12-22 PROCEDURE — 86308 HETEROPHILE ANTIBODY SCREEN: CPT

## 2020-12-22 PROCEDURE — 81025 URINE PREGNANCY TEST: CPT | Performed by: NURSE PRACTITIONER

## 2020-12-22 PROCEDURE — 96361 HYDRATE IV INFUSION ADD-ON: CPT

## 2020-12-22 PROCEDURE — 87502 INFLUENZA DNA AMP PROBE: CPT

## 2020-12-22 PROCEDURE — 80053 COMPREHEN METABOLIC PANEL: CPT

## 2020-12-22 PROCEDURE — 85025 COMPLETE CBC W/AUTO DIFF WBC: CPT

## 2020-12-22 PROCEDURE — 96374 THER/PROPH/DIAG INJ IV PUSH: CPT

## 2020-12-22 PROCEDURE — 63600175 PHARM REV CODE 636 W HCPCS: Performed by: STUDENT IN AN ORGANIZED HEALTH CARE EDUCATION/TRAINING PROGRAM

## 2020-12-22 PROCEDURE — 96375 TX/PRO/DX INJ NEW DRUG ADDON: CPT

## 2020-12-22 PROCEDURE — 93010 EKG 12-LEAD: ICD-10-PCS | Mod: ,,, | Performed by: INTERNAL MEDICINE

## 2020-12-22 PROCEDURE — 93010 ELECTROCARDIOGRAM REPORT: CPT | Mod: ,,, | Performed by: INTERNAL MEDICINE

## 2020-12-22 PROCEDURE — 25000003 PHARM REV CODE 250: Performed by: STUDENT IN AN ORGANIZED HEALTH CARE EDUCATION/TRAINING PROGRAM

## 2020-12-22 RX ORDER — ONDANSETRON 4 MG/1
4 TABLET, ORALLY DISINTEGRATING ORAL EVERY 8 HOURS PRN
Qty: 10 TABLET | Refills: 0 | Status: SHIPPED | OUTPATIENT
Start: 2020-12-22

## 2020-12-22 RX ORDER — BUTALBITAL, ACETAMINOPHEN AND CAFFEINE 50; 325; 40 MG/1; MG/1; MG/1
1 TABLET ORAL ONCE
Status: COMPLETED | OUTPATIENT
Start: 2020-12-22 | End: 2020-12-22

## 2020-12-22 RX ORDER — KETOROLAC TROMETHAMINE 30 MG/ML
15 INJECTION, SOLUTION INTRAMUSCULAR; INTRAVENOUS
Status: COMPLETED | OUTPATIENT
Start: 2020-12-22 | End: 2020-12-22

## 2020-12-22 RX ORDER — FAMOTIDINE 10 MG/ML
20 INJECTION INTRAVENOUS
Status: COMPLETED | OUTPATIENT
Start: 2020-12-22 | End: 2020-12-22

## 2020-12-22 RX ORDER — ONDANSETRON 2 MG/ML
4 INJECTION INTRAMUSCULAR; INTRAVENOUS
Status: COMPLETED | OUTPATIENT
Start: 2020-12-22 | End: 2020-12-22

## 2020-12-22 RX ORDER — ACETAMINOPHEN 500 MG
1000 TABLET ORAL
Status: COMPLETED | OUTPATIENT
Start: 2020-12-22 | End: 2020-12-22

## 2020-12-22 RX ADMIN — FAMOTIDINE 20 MG: 10 INJECTION, SOLUTION INTRAVENOUS at 10:12

## 2020-12-22 RX ADMIN — ACETAMINOPHEN 1000 MG: 500 TABLET, FILM COATED ORAL at 08:12

## 2020-12-22 RX ADMIN — SODIUM CHLORIDE 1000 ML: 0.9 INJECTION, SOLUTION INTRAVENOUS at 08:12

## 2020-12-22 RX ADMIN — KETOROLAC TROMETHAMINE 15 MG: 30 INJECTION, SOLUTION INTRAMUSCULAR; INTRAVENOUS at 10:12

## 2020-12-22 RX ADMIN — ONDANSETRON 4 MG: 2 INJECTION INTRAMUSCULAR; INTRAVENOUS at 08:12

## 2020-12-22 RX ADMIN — BUTALBITAL, ACETAMINOPHEN AND CAFFEINE 1 TABLET: 50; 325; 40 TABLET ORAL at 09:12

## 2020-12-23 NOTE — ED PROVIDER NOTES
"Encounter Date: 12/22/2020       History     Chief Complaint   Patient presents with    Fever     since the 10th after botox for HA, was seen once in [place] for same    Fatigue     HPI     Ms. Moran is a 30 y.o. female with history of migraines who presents to the ED with complaints of fever, malaise, generalized weakness, shortness of breath, neck pain, frontal headache with nausea and decreased PO intake since 12/12/20. Patient states she saw her neurologist on 12/10/20 and had botox injections to her face, bilateral trapezius and temporal regions. States she had had this done previously for her migraines and it had helped. Patient states after her injection she has continued to have pain neck pain and headache. Called her neurologist who now is out of town so couldn't see her. 2 days later patient stated the glands in her neck started to enlarge and she was having sore throat. Patient also then started with shortness of breath, fatigue and generalized weakness that has been progressively getting worse. Was seen 3-4 days ago in another ER at which time she had a negative COVID screen, diagnosed with viral pharyngititis and treated her headache with toradol, compazine and benadryl with improvement in her headache and she was discharged home. She states her fatigue and generalized weakness has gotten worse with continued nausea so she has not been able to take much PO. Patient came in because now she feels so weak that she is not able take care of her kids. Tmax at home 103F. Also endorses LUQ abdominal pain with no urinary symptoms, vomiting or diarrhea. Describes whole body myalgias as well.     Denies tobacco use. Endorses social alcohol use. Denies drug use.     Review of patient's allergies indicates:   Allergen Reactions    Maxalt [rizatriptan] Other (See Comments)     Pt reports " neck stiffness"        Past Medical History:   Diagnosis Date    Migraine headache      Past Surgical History:   Procedure " Laterality Date    DILATION AND CURETTAGE OF UTERUS      VAGINAL DELIVERY  12/19/2018     Family History   Problem Relation Age of Onset    No Known Problems Mother     No Known Problems Father     No Known Problems Maternal Aunt     No Known Problems Paternal Aunt     No Known Problems Maternal Grandmother     No Known Problems Paternal Grandmother      Social History     Tobacco Use    Smoking status: Never Smoker    Smokeless tobacco: Never Used   Substance Use Topics    Alcohol use: No     Frequency: Never    Drug use: No     Review of Systems   Constitutional: Positive for appetite change (decreased), chills, fatigue and fever.   HENT: Negative for congestion and nosebleeds.    Eyes: Positive for pain (behind eyes).   Respiratory: Positive for shortness of breath. Negative for cough.    Cardiovascular: Negative for chest pain and leg swelling.   Gastrointestinal: Positive for abdominal pain (left upper quadrant) and nausea. Negative for diarrhea and vomiting.   Genitourinary: Negative for dysuria and hematuria.   Musculoskeletal: Positive for back pain (low back pain) and neck pain.   Skin: Negative for rash.   Neurological: Positive for weakness (generalized) and headaches.       Physical Exam     Initial Vitals [12/22/20 1747]   BP Pulse Resp Temp SpO2   105/71 83 (!) 24 100.3 °F (37.9 °C) 100 %      MAP       --         Physical Exam    Nursing note and vitals reviewed.  Constitutional: She appears well-developed and well-nourished.   Young female resting comfortably in stretcher but appears that she does not feel well, speaking in full sentences with no signs of acute respiratory distress   HENT:   Head: Normocephalic and atraumatic.   Right Ear: External ear normal.   Left Ear: External ear normal.   Posterior oropharynx erythematous but no exudates appreciated, uvula midline   Eyes: Conjunctivae and EOM are normal. Pupils are equal, round, and reactive to light.   Neck: Normal range of motion.  Neck supple.   Mild tenderness to palpation over the lateral trapezius muscles with no overlying skin changes, normal range of motion of the neck with no meningeal signs   Cardiovascular: Normal rate, regular rhythm, normal heart sounds and intact distal pulses.   No murmur heard.  Pulmonary/Chest: Breath sounds normal. No respiratory distress. She has no wheezes. She exhibits no tenderness.   Abdominal: Soft. Bowel sounds are normal. She exhibits no distension. There is no abdominal tenderness (Mild tenderness to palpation in the left upper quadrant with no peritoneal signs and no CVA tenderness). There is no rebound and no guarding.   Musculoskeletal: Normal range of motion.      Comments: No calf tenderness, no lower extremity edema   Lymphadenopathy:     She has cervical adenopathy (Bilateral cervical lymphadenopathy appreciated, no submandibular fullness or tongue elevation).   Neurological: She is alert and oriented to person, place, and time. She has normal strength. No cranial nerve deficit or sensory deficit. GCS score is 15. GCS eye subscore is 4. GCS verbal subscore is 5. GCS motor subscore is 6.   5/5 strength in all extremities, sensation light touch intact throughout, normal cerebellar function with finger-to-nose, gross cranial nerves 2-12 intact   Skin: Skin is warm and dry. Capillary refill takes less than 2 seconds.         ED Course   Procedures  Labs Reviewed   CBC W/ AUTO DIFFERENTIAL - Abnormal; Notable for the following components:       Result Value    WBC 2.83 (*)     Hemoglobin 11.1 (*)     Hematocrit 34.9 (*)     MCH 26.3 (*)     MCHC 31.8 (*)     Immature Granulocytes 1.4 (*)     Gran # (ANC) 1.6 (*)     Mono # 0.2 (*)     All other components within normal limits   COMPREHENSIVE METABOLIC PANEL - Abnormal; Notable for the following components:    Sodium 135 (*)     Alkaline Phosphatase 42 (*)     All other components within normal limits   URINALYSIS, REFLEX TO URINE CULTURE - Abnormal;  Notable for the following components:    pH, UA >8.0 (*)     Ketones, UA Trace (*)     All other components within normal limits    Narrative:     Specimen Source->Urine   HETEROPHILE AB SCREEN   SARS-COV-2 RNA AMPLIFICATION, QUAL   INFLUENZA A AND B ANTIGEN    Narrative:     Specimen Source->Nasopharyngeal Swab   LIPASE   LIPASE   POCT URINE PREGNANCY          Imaging Results          X-Ray Chest PA And Lateral (In process)                  Medical Decision Making:   Initial Assessment:   30-year-old female with past medical history of migraines and anxiety presents emergency department with 10 days of subjective fevers, nausea, shortness of breath, myalgias including neck pain, sore throat and frontal headache after getting Botox injections as preventative measures for migraines on the 10th of this month by her neurologist.  Patient states 2 days later her symptoms started.  Patient previously seen at OSH 3-4 days ago for similar complaints and was diagnosed with viral pharyngitis with a negative coronavirus screen and was treated for migraine headache.  Patient arrives with a low-grade temp of a 100.3°, RRR, tacky mucous membranes, clear to auscultation bilaterally, soft abdomen with mild tenderness to palpation of the left upper quadrant with no peritoneal signs, no signs of meningismus, nonfocal neurologic exam, no signs of respiratory distress.  Differential Diagnosis:   Includes but is not limited to viral syndrome, coronavirus, mononucleosis, influenza, pneumonia, pharyngitis bacterial versus viral, medication side effect to Botox injection, dehydration, electrolyte abnormality, gastritis, pancreatitis, low suspicion for colitis and no history of diarrhea or bowel changes, lower suspicion for bacterial or viral meningitis given duration of symptoms as well as no signs of meningismus  Clinical Tests:   Lab Tests: Ordered and Reviewed  Radiological Study: Ordered and Reviewed  ED Management:  Will repeat  patient's coronavirus screen, will order Monospot, influenza a, CBC, CMP, chest x-ray, UA, UPT.  Will plan to treat patient's headache with Tylenol and treat her nausea with Zofran and an order IV fluids the patient appears dry with decreased p.o. intake.  Will plan to reassess headache and need for further medication.  Will hold off on CT head at this time as patient states that her headache feels similar to her previous migraines and no history of head trauma. Case discussed with Dr. Diallo.     Brenda Engle MD  PGY-4 [facility]    Attending Note:  I provided a face to face evaluation of this patient.  I discussed the patient's care with the Resident.  I reviewed their note and agree with the history, physical, assessment, diagnosis, treatment, all procedures performed, xray and EKG interpretations and discharge plan provided by the Resident. My overall impression is migraine, fever, mylagias, sob.  Please see resident's note for full note patient been having ongoing headaches despite recently having Botox injections for migraine headaches for evaluated here because she is now having low-grade fevers and myalgias she was offered evaluation for meningitis and encephalitis with lumbar puncture but she refused.  Mono flu and COVID were again negative here.  She was found to have mild anemia a mildly low white blood cell count 2.83.  Lab work was otherwise normal.  She was neuro intact with no meningismus or nuchal rigidity.  She was treated here with a g of Tylenol, and Zofran with 1 L fluids due to signs of volume depletion, she was then given 15 mg of Toradol IV, 20 mg of Pepcid, and Fioricet.  She also he was discharged home with referral to a new neurologist.  We not found that specific cause of her myalgias low-grade fevers and ongoing headache she should follow-up with Neurology and primary for further evaluation and treatment.  The patient has been instructed to follow up with their physician  or the one provided as well as specific return precautions.   Jessica Diallo M.D. 12/23/2020 2:58 AM                     ED Course as of Dec 22 2355   Tue Dec 22, 2020   2132 Patient still complaining of headache. Will order fioricet.     [MK]   2140 EKG showing normal sinus rhythm, rate 84 bpm, normal intervals, no ST elevations or T wave inversions per my interpretation.     [MK]   2159 SARS-CoV-2 RNA, Amplification, Qual: Negative [MK]   2159 Influenza A, Molecular: Negative [MK]   2159 Influenza B, Molecular: Negative [MK]   2159 NITRITE UA: Negative [MK]   2159 Leukocytes, UA: Negative [MK]   2159 WBC(!): 2.83 [MK]   2159 Preg Test, Ur: Negative [MK]   2200 Sodium(!): 135 [MK]   2203 Monospot: Negative [MK]   2251 Patient still complains of headache. Continues to have good range of motion of neck with no meningeal signs. Offered patient LP to rule out meningitis however patient declines at this time. Patient asking for another neurologist.     [MK]   2313 Patient tolerating PO. Given strict return precautions including but not limited to worsening headache, neuro deficits, worsening shortness of breath or inability to move her neck. Patient advised to follow-up with her neurologist and her primary care provider. Patient requesting for new neurologist and has been referred to [facility] neurology as patient would like someone in [place] where she lives.     [MK]      ED Course User Index  [MK] Brenda Engle MD            Clinical Impression:     ICD-10-CM ICD-9-CM   1. Fever, unspecified fever cause  R50.9 780.60   2. Shortness of breath  R06.02 786.05   3. Migraine without status migrainosus, not intractable, unspecified migraine type  G43.909 346.90   4. Myalgia  M79.10 729.1                          ED Disposition Condition    Discharge Stable        ED Prescriptions     Medication Sig Dispense Start Date End Date Auth. Provider    ondansetron (ZOFRAN-ODT) 4 MG TbDL Take 1 tablet (4 mg total) by mouth  every 8 (eight) hours as needed (nausea). 10 tablet 12/22/2020  Brenda Engle MD        Follow-up Information     Follow up With Specialties Details Why Contact Info Additional Information    [facility]  If symptoms worsen [address] [facility] [id number] [phone] [address] [place] [address] - Neurology/Ms/Stroke Neurology Schedule an appointment as soon as possible for a visit in 2 weeks  [address] [facility] [address]  [phone]       Your Neurologist  Call in 1 day To arrange a follow-up appointment.      Your Primary Care Provider  Schedule an appointment as soon as possible for a visit  To follow-up on your symptoms.                                         Brenda Engle MD  Resident  12/22/20 9128       Brenda Engle MD  Resident  12/22/20 4932       Jessica Diallo MD  12/23/20 8338

## 2020-12-23 NOTE — DISCHARGE INSTRUCTIONS
Return to the emergency department for worsening symptoms, inability to keep down fluids, chest pain, worsening shortness of breath, severe abdominal pain, vision changes or difficulty swallowing.    You need to call your primary care follow-up to schedule an appointment ASAP to follow-up on your symptoms.     You have been given information for a neurologist at [facility] in [place] and a referral has been made. Call to schedule an appointment if you are not able to follow-up with your neurologist.     You have been prescribed zofran to help with your nausea to allow you to keep pushing fluids at home.

## 2021-01-28 ENCOUNTER — TELEPHONE (OUTPATIENT)
Dept: NEUROLOGY | Facility: CLINIC | Age: 31
End: 2021-01-28

## 2021-02-08 ENCOUNTER — TELEPHONE (OUTPATIENT)
Dept: NEUROLOGY | Facility: CLINIC | Age: 31
End: 2021-02-08

## 2021-03-24 ENCOUNTER — TELEPHONE (OUTPATIENT)
Dept: DERMATOLOGY | Facility: CLINIC | Age: 31
End: 2021-03-24

## 2021-04-16 ENCOUNTER — PATIENT MESSAGE (OUTPATIENT)
Dept: RESEARCH | Facility: HOSPITAL | Age: 31
End: 2021-04-16

## 2022-02-27 ENCOUNTER — HOSPITAL ENCOUNTER (EMERGENCY)
Facility: HOSPITAL | Age: 32
Discharge: HOME | End: 2022-02-27
Attending: EMERGENCY MEDICINE
Payer: MEDICAID

## 2022-02-27 VITALS
DIASTOLIC BLOOD PRESSURE: 66 MMHG | SYSTOLIC BLOOD PRESSURE: 100 MMHG | HEART RATE: 77 BPM | TEMPERATURE: 98 F | WEIGHT: 140 LBS | BODY MASS INDEX: 23.32 KG/M2 | OXYGEN SATURATION: 100 % | RESPIRATION RATE: 18 BRPM | HEIGHT: 65 IN

## 2022-02-27 DIAGNOSIS — O03.9 MISCARRIAGE: Primary | ICD-10-CM

## 2022-02-27 DIAGNOSIS — N93.9 VAGINAL BLEEDING: ICD-10-CM

## 2022-02-27 LAB
ABO + RH BLD: NORMAL
ALBUMIN SERPL BCP-MCNC: 4.1 G/DL (ref 3.5–5.2)
ALP SERPL-CCNC: 41 U/L (ref 55–135)
ALT SERPL W/O P-5'-P-CCNC: 12 U/L (ref 10–44)
ANION GAP SERPL CALC-SCNC: 10 MMOL/L (ref 8–16)
AST SERPL-CCNC: 16 U/L (ref 10–40)
B-HCG UR QL: POSITIVE
BASOPHILS # BLD AUTO: 0.05 K/UL (ref 0–0.2)
BASOPHILS NFR BLD: 0.9 % (ref 0–1.9)
BILIRUB SERPL-MCNC: 0.6 MG/DL (ref 0.1–1)
BLD GP AB SCN CELLS X3 SERPL QL: NORMAL
BUN SERPL-MCNC: 13 MG/DL (ref 6–20)
CALCIUM SERPL-MCNC: 9.1 MG/DL (ref 8.7–10.5)
CHLORIDE SERPL-SCNC: 102 MMOL/L (ref 95–110)
CO2 SERPL-SCNC: 23 MMOL/L (ref 23–29)
CREAT SERPL-MCNC: 0.8 MG/DL (ref 0.5–1.4)
CTP QC/QA: YES
DIFFERENTIAL METHOD: ABNORMAL
EOSINOPHIL # BLD AUTO: 0.1 K/UL (ref 0–0.5)
EOSINOPHIL NFR BLD: 1.8 % (ref 0–8)
ERYTHROCYTE [DISTWIDTH] IN BLOOD BY AUTOMATED COUNT: 12.7 % (ref 11.5–14.5)
EST. GFR  (AFRICAN AMERICAN): >60 ML/MIN/1.73 M^2
EST. GFR  (NON AFRICAN AMERICAN): >60 ML/MIN/1.73 M^2
GLUCOSE SERPL-MCNC: 99 MG/DL (ref 70–110)
HCG INTACT+B SERPL-ACNC: 2146 MIU/ML
HCT VFR BLD AUTO: 33.1 % (ref 37–48.5)
HGB BLD-MCNC: 10.7 G/DL (ref 12–16)
IMM GRANULOCYTES # BLD AUTO: 0.01 K/UL (ref 0–0.04)
IMM GRANULOCYTES NFR BLD AUTO: 0.2 % (ref 0–0.5)
LYMPHOCYTES # BLD AUTO: 2.9 K/UL (ref 1–4.8)
LYMPHOCYTES NFR BLD: 52.2 % (ref 18–48)
MCH RBC QN AUTO: 27.6 PG (ref 27–31)
MCHC RBC AUTO-ENTMCNC: 32.3 G/DL (ref 32–36)
MCV RBC AUTO: 85 FL (ref 82–98)
MONOCYTES # BLD AUTO: 0.4 K/UL (ref 0.3–1)
MONOCYTES NFR BLD: 7.3 % (ref 4–15)
NEUTROPHILS # BLD AUTO: 2.1 K/UL (ref 1.8–7.7)
NEUTROPHILS NFR BLD: 37.6 % (ref 38–73)
NRBC BLD-RTO: 0 /100 WBC
PLATELET # BLD AUTO: 299 K/UL (ref 150–450)
PMV BLD AUTO: 9.7 FL (ref 9.2–12.9)
POTASSIUM SERPL-SCNC: 3.8 MMOL/L (ref 3.5–5.1)
PROT SERPL-MCNC: 7.9 G/DL (ref 6–8.4)
RBC # BLD AUTO: 3.88 M/UL (ref 4–5.4)
SODIUM SERPL-SCNC: 135 MMOL/L (ref 136–145)
WBC # BLD AUTO: 5.58 K/UL (ref 3.9–12.7)

## 2022-02-27 PROCEDURE — 25000003 PHARM REV CODE 250: Performed by: EMERGENCY MEDICINE

## 2022-02-27 PROCEDURE — 84702 CHORIONIC GONADOTROPIN TEST: CPT | Performed by: EMERGENCY MEDICINE

## 2022-02-27 PROCEDURE — 81025 URINE PREGNANCY TEST: CPT | Performed by: EMERGENCY MEDICINE

## 2022-02-27 PROCEDURE — 99284 EMERGENCY DEPT VISIT MOD MDM: CPT

## 2022-02-27 PROCEDURE — 86850 RBC ANTIBODY SCREEN: CPT | Performed by: EMERGENCY MEDICINE

## 2022-02-27 PROCEDURE — 85025 COMPLETE CBC W/AUTO DIFF WBC: CPT | Performed by: EMERGENCY MEDICINE

## 2022-02-27 PROCEDURE — 80053 COMPREHEN METABOLIC PANEL: CPT | Performed by: EMERGENCY MEDICINE

## 2022-02-27 RX ORDER — MISOPROSTOL 100 UG/1
400 TABLET ORAL EVERY 8 HOURS
Qty: 8 TABLET | Refills: 0 | OUTPATIENT
Start: 2022-02-27 | End: 2023-07-19

## 2022-02-27 RX ORDER — MISOPROSTOL 100 UG/1
400 TABLET ORAL
Status: COMPLETED | OUTPATIENT
Start: 2022-02-27 | End: 2022-02-27

## 2022-02-27 RX ADMIN — MISOPROSTOL 400 MCG: 100 TABLET ORAL at 06:02

## 2022-02-27 NOTE — ED PROVIDER NOTES
"Encounter Date: 2022       History     Chief Complaint   Patient presents with    Vaginal Bleeding     Bleeding x1 week, started bleeding heavily with clots yesterday morning    Abdominal Pain     Lower abdominal pain    Nausea     Chief complaint is vaginal bleeding.  The patient has been bleeding for 1 week now.  She has irregular periods normally.  She is a G4, P1, one  one molar pregancy and this pregnancy.  In the last 24 hours she has use about 30 pad she states.  Multiple clots have been coming out of her body.  She is a patient of Dr. Galindo.        Review of patient's allergies indicates:   Allergen Reactions    Maxalt [rizatriptan] Other (See Comments)     Pt reports " neck stiffness"        Past Medical History:   Diagnosis Date    Migraine headache      Past Surgical History:   Procedure Laterality Date    DILATION AND CURETTAGE OF UTERUS      VAGINAL DELIVERY  2018     Family History   Problem Relation Age of Onset    No Known Problems Mother     No Known Problems Father     No Known Problems Maternal Aunt     No Known Problems Paternal Aunt     No Known Problems Maternal Grandmother     No Known Problems Paternal Grandmother      Social History     Tobacco Use    Smoking status: Never Smoker    Smokeless tobacco: Never Used   Substance Use Topics    Alcohol use: No    Drug use: No     Review of Systems   Constitutional: Negative for chills and fever.   HENT: Negative for ear pain, rhinorrhea and sore throat.    Eyes: Negative for pain and visual disturbance.   Respiratory: Negative for cough and shortness of breath.    Cardiovascular: Negative for chest pain and palpitations.   Gastrointestinal: Negative for abdominal pain, constipation, diarrhea, nausea and vomiting.   Genitourinary: Positive for vaginal bleeding. Negative for dysuria, frequency, hematuria and urgency.   Musculoskeletal: Negative for back pain, joint swelling and myalgias.   Skin: Negative for rash. "   Neurological: Negative for dizziness, seizures, weakness and headaches.   Psychiatric/Behavioral: Negative for dysphoric mood. The patient is not nervous/anxious.        Physical Exam     Initial Vitals [02/27/22 0354]   BP Pulse Resp Temp SpO2   (!) 121/92 88 20 97.8 °F (36.6 °C) 97 %      MAP       --         Physical Exam    Nursing note and vitals reviewed.  Constitutional: She appears well-developed and well-nourished.   HENT:   Head: Normocephalic and atraumatic.   Nose: Nose normal.   Eyes: Conjunctivae, EOM and lids are normal. Pupils are equal, round, and reactive to light.   Neck: Trachea normal. Neck supple. No thyroid mass and no thyromegaly present.   Normal range of motion.  Cardiovascular: Normal rate, regular rhythm and normal heart sounds.   Pulmonary/Chest: Effort normal and breath sounds normal.   Abdominal: Abdomen is soft. There is no abdominal tenderness.   Genitourinary:    Genitourinary Comments: The patient on speculum exam has a large clot that was removed.  Looking at the cervix she has minimal oozing from it.  The patient on bimanual exam has an  open os.     Musculoskeletal:         General: Normal range of motion.      Cervical back: Normal range of motion and neck supple.     Neurological: She is alert and oriented to person, place, and time. She has normal strength and normal reflexes. No cranial nerve deficit or sensory deficit.   Skin: Skin is warm and dry.   Psychiatric: She has a normal mood and affect. Her speech is normal and behavior is normal. Judgment and thought content normal.         ED Course   Procedures  Labs Reviewed   CBC W/ AUTO DIFFERENTIAL - Abnormal; Notable for the following components:       Result Value    RBC 3.88 (*)     Hemoglobin 10.7 (*)     Hematocrit 33.1 (*)     Gran % 37.6 (*)     Lymph % 52.2 (*)     All other components within normal limits    Narrative:     Release to patient->Immediate   COMPREHENSIVE METABOLIC PANEL - Abnormal; Notable for the  following components:    Sodium 135 (*)     Alkaline Phosphatase 41 (*)     All other components within normal limits    Narrative:     Release to patient->Immediate   POCT URINE PREGNANCY - Abnormal; Notable for the following components:    POC Preg Test, Ur Positive (*)     All other components within normal limits   HCG, QUANTITATIVE    Narrative:     Release to patient->Immediate   TYPE & SCREEN          Imaging Results          US OB Transvaginal (Final result)  Result time 02/27/22 05:44:00   Procedure changed from US OB <14 Wks, TransAbd, Single Gestation     Final result by Kitty Phipps MD (02/27/22 05:44:00)                 Narrative:    EXAM:  US Pregnancy, Transvaginal    CLINICAL HISTORY:  The patient is 31 years old and is Female; + upt. vag bleeding x 1 wk. passing clots x 1 day. lmp ??    TECHNIQUE:  Real-time transvaginal obstetrical ultrasound of the maternal pelvis and a first trimester pregnancy with image documentation.  Transvaginal imaging was used for better evaluation of the fetus and adnexa.    COMPARISON:  No relevant prior studies available.    FINDINGS:  GESTATION:  There is no evidence of an intrauterine pregnancy.  PLACENTA/AMNIOTIC FLUID:  Cannot be adequately evaluated due to the early gestational age.  UTERUS/CERVIX:  Nabothian cysts are present.  No myometrial mass.  OVARIES: Unremarkable.  No mass.  FREE FLUID:  Small amount of free fluid is present within the cul-de-sac.    IMPRESSION:  No evidence of an intrauterine pregnancy. Findings suggest a pregnancy of unknown location. Recommend continued follow-up with serial hCG and ultrasound.    Electronically signed by:  Kitty Phipps MD  2/27/2022 5:44 AM [facility] Workstation: [id number]                               Medications   miSOPROStoL tablet 400 mcg (400 mcg Oral Given 2/27/22 0616)     Medical Decision Making:   ED Management:  The patient has a clinical presentation of miscarriage.  The patient has clots that she is passing  from the vaginal vault.  The patient has a quantitative beta hCG of 2100. On bimanual  exam the os is open with minimal oozing.  I presented the case to Dr. Conway on-call for Dr. Galindo.  She recommended Cytotec which we will start in the ER.  She will also be given instructions to follow-up on Tuesday for repeat quantitative beta hCG.  She also has a stable hematocrit 33 at the present time.                      Clinical Impression:   Final diagnoses:  [N93.9] Vaginal bleeding  [O03.9] Miscarriage (Primary)          ED Disposition Condition    Discharge Stable        ED Prescriptions     Medication Sig Dispense Start Date End Date Auth. Provider    miSOPROStoL (CYTOTEC) 100 MCG Tab Take 4 tablets (400 mcg total) by mouth every 8 (eight) hours. for 2 doses 8 tablet 2/27/2022 2/28/2022 Lizzie Boggs MD        Follow-up Information     Follow up With Specialties Details Why Contact Info    Henrik Galindo MD Obstetrics, Obstetrics and Gynecology Schedule an appointment as soon as possible for a visit   [address] [facility]  [facility]Jeff[facility] HospitalMARIA GUADALUPE BERAULT [facility]  [facility] [address]  [phone]             Lizzie Boggs MD  02/27/22 0620

## 2022-02-27 NOTE — DISCHARGE INSTRUCTIONS
Take the cytotec doses every 8 hours x2.  You will need to get a repeat quantitative beta hCG blood test on Tuesday.  Make an appointment with Dr. Galindo.

## 2022-03-02 ENCOUNTER — TELEPHONE (OUTPATIENT)
Dept: HEPATOLOGY | Facility: HOSPITAL | Age: 32
End: 2022-03-02
Payer: MEDICAID

## 2022-03-02 NOTE — TELEPHONE ENCOUNTER
Received call from patient that she is hospitalized at  for miscarriage requiring D&C and 3 blood transfusions thus far. Was to dc today, but hgb 5.9 so needs more blood. Was worried about what may be going on and wanted second opinion. Asked me to review labs, but unable to see  results. Only labs from [facility] seen.

## 2022-10-05 ENCOUNTER — TELEPHONE (OUTPATIENT)
Dept: NEUROLOGY | Facility: CLINIC | Age: 32
End: 2022-10-05
Payer: MEDICAID

## 2022-10-05 NOTE — TELEPHONE ENCOUNTER
Left message, we do not have any available new patient appointments available at this time.  You will need to call back after the new year.  Left phone number on message for [facility]. [phone].

## 2022-10-05 NOTE — TELEPHONE ENCOUNTER
----- Message from Berta Garcia sent at 10/4/2022  5:52 PM CDT -----    Patient Returning Call        Who Called:pt  Does the patient know what this is regarding?:appt for meds need new appt  Would the patient rather a call back or a response via [facility]?call  Best Call Back Number:[phone]  Additional Information: call back

## 2023-03-18 ENCOUNTER — HOSPITAL ENCOUNTER (EMERGENCY)
Facility: OTHER | Age: 33
Discharge: HOME | End: 2023-03-18
Attending: EMERGENCY MEDICINE
Payer: MEDICAID

## 2023-03-18 VITALS
HEART RATE: 75 BPM | BODY MASS INDEX: 23.32 KG/M2 | TEMPERATURE: 98 F | WEIGHT: 140 LBS | SYSTOLIC BLOOD PRESSURE: 107 MMHG | RESPIRATION RATE: 16 BRPM | DIASTOLIC BLOOD PRESSURE: 62 MMHG | OXYGEN SATURATION: 98 % | HEIGHT: 65 IN

## 2023-03-18 DIAGNOSIS — J06.9 VIRAL URI WITH COUGH: Primary | ICD-10-CM

## 2023-03-18 LAB
B-HCG UR QL: NEGATIVE
CTP QC/QA: YES
POC MOLECULAR INFLUENZA A AGN: NEGATIVE
POC MOLECULAR INFLUENZA B AGN: NEGATIVE
SARS-COV-2 RDRP RESP QL NAA+PROBE: NEGATIVE

## 2023-03-18 PROCEDURE — 81025 URINE PREGNANCY TEST: CPT | Performed by: PHYSICIAN ASSISTANT

## 2023-03-18 PROCEDURE — 99284 EMERGENCY DEPT VISIT MOD MDM: CPT

## 2023-03-18 RX ORDER — BENZONATATE 100 MG/1
100 CAPSULE ORAL 3 TIMES DAILY PRN
Qty: 20 CAPSULE | Refills: 0 | Status: SHIPPED | OUTPATIENT
Start: 2023-03-18 | End: 2023-03-28

## 2023-03-18 RX ORDER — FLUTICASONE PROPIONATE 50 MCG
1 SPRAY, SUSPENSION (ML) NASAL 2 TIMES DAILY
Qty: 15 G | Refills: 0 | OUTPATIENT
Start: 2023-03-18 | End: 2023-07-19

## 2023-03-18 NOTE — ED TRIAGE NOTES
Pt reports to ED with flu-like symptoms including stuffy nose, generalized body aches, cough and congestion for the past couple of days. Pt also reports tenderness to chest wall with palpation. Denies shortness of breath. Reports diarrhea and denies fever. Taking OTC mucinex. Aaox4.

## 2023-03-18 NOTE — ED PROVIDER NOTES
"     Source of History:  Patient     Chief complaint:  URI (Reports body aches, productive cough, fatigue, sore throat, chills onset 4 days ago. States "I believe I have the flu". Aaox4. )      HPI:  Vivian Moran is a 32 y.o. 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  She reports taking Mucinex over-the-counter.  She reports diarrhea as well but no vomiting.  She denies urinary symptoms.  No recorded fever at home.  This is the extent to the patients complaints today here in the emergency department.    ROS: As per HPI     Review of patient's allergies indicates:   Allergen Reactions    Maxalt [rizatriptan] Other (See Comments)     Pt reports " neck stiffness"          PMH:  As per HPI and below:  Past Medical History:   Diagnosis Date    Migraine headache      Past Surgical History:   Procedure Laterality Date    DILATION AND CURETTAGE OF UTERUS      VAGINAL DELIVERY  12/19/2018       Social History     Tobacco Use    Smoking status: Never    Smokeless tobacco: Never   Substance Use Topics    Alcohol use: No    Drug use: No       Physical Exam:    /72 (BP Location: Left arm, Patient Position: Sitting)   Pulse 85   Temp 98.3 °F (36.8 °C) (Oral)   Resp 18   Ht 5' 5" (1.651 m)   Wt 63.5 kg (140 lb)   LMP 03/18/2023 (Approximate)   SpO2 99%   BMI 23.30 kg/m²   Nursing note and vital signs reviewed.  Appearance: No acute distress.  Nontoxic appearing.  Eyes: No conjunctival injection.  ENT:  Cobblestoning posterior oropharynx.  No tonsillar swelling or exudate.  No trismus.  Uvula midline.    Chest/ Respiratory: Clear to auscultation bilaterally.  Good air movement.  No wheezes.  No rhonchi. No rales. No accessory muscle use.  Cardiovascular: Regular rate and rhythm.  No murmurs. No gallops. No rubs.  Musculoskeletal: Good range of motion all joints.  No " deformities.  Neck supple.  No meningismus.  Skin: No rashes seen.  Good turgor.  No abrasions.  No ecchymoses.  Neurologic: Motor intact.  Sensation intact  Mental Status:  Alert and oriented x 3.  Appropriate, conversant.   Labs that have been ordered have been independently reviewed and interpreted by myself.    I decided to obtain the patient's medical records.      MDM/ Differential Dx:    32 y.o. 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  Patient is afebrile, nontoxic appearing hemodynamically stable.  Lungs are clear auscultation bilaterally.  Rapid COVID and flu screen are negative.  I do not suspect pneumonia, strep, PTA.  Suspect likely other viral illness.  Advised on supportive care, strict return precautions to the ED.             Diagnostic Impression:    1. Viral URI with cough         ED Disposition Condition    Discharge Stable                    José Luis Nieves PA-C  03/18/23 0760

## 2023-04-27 ENCOUNTER — TELEPHONE (OUTPATIENT)
Dept: NEUROLOGY | Facility: CLINIC | Age: 33
End: 2023-04-27
Payer: MEDICAID

## 2023-04-27 NOTE — TELEPHONE ENCOUNTER
----- Message from Clarisse Diallo sent at 4/26/2023  6:27 PM CDT -----  Contact: [phone]  Pt called to advise that she missed her initial appointment and would like to make another appointment. Please Advise

## 2023-04-27 NOTE — TELEPHONE ENCOUNTER
Attempted to contact patient, no answer, left voice mail stating that at this time we do not have any new medicaid appointments available. She can contact the [place] or [facility] neuro and left those numbers.

## 2023-06-01 ENCOUNTER — TELEPHONE (OUTPATIENT)
Dept: NEUROLOGY | Facility: CLINIC | Age: 33
End: 2023-06-01
Payer: MEDICAID

## 2023-06-01 ENCOUNTER — HOSPITAL ENCOUNTER (EMERGENCY)
Facility: OTHER | Age: 33
Discharge: HOME | End: 2023-06-01
Attending: EMERGENCY MEDICINE
Payer: MEDICAID

## 2023-06-01 VITALS
SYSTOLIC BLOOD PRESSURE: 110 MMHG | RESPIRATION RATE: 17 BRPM | HEIGHT: 65 IN | DIASTOLIC BLOOD PRESSURE: 76 MMHG | HEART RATE: 61 BPM | BODY MASS INDEX: 23.32 KG/M2 | OXYGEN SATURATION: 99 % | TEMPERATURE: 98 F | WEIGHT: 140 LBS

## 2023-06-01 DIAGNOSIS — G43.801 OTHER MIGRAINE WITH STATUS MIGRAINOSUS, NOT INTRACTABLE: Primary | ICD-10-CM

## 2023-06-01 LAB
B-HCG UR QL: NEGATIVE
CTP QC/QA: YES

## 2023-06-01 PROCEDURE — 96365 THER/PROPH/DIAG IV INF INIT: CPT

## 2023-06-01 PROCEDURE — 96375 TX/PRO/DX INJ NEW DRUG ADDON: CPT

## 2023-06-01 PROCEDURE — 25000003 PHARM REV CODE 250: Performed by: PHYSICIAN ASSISTANT

## 2023-06-01 PROCEDURE — 81025 URINE PREGNANCY TEST: CPT | Performed by: EMERGENCY MEDICINE

## 2023-06-01 PROCEDURE — 63600175 PHARM REV CODE 636 W HCPCS: Performed by: PHYSICIAN ASSISTANT

## 2023-06-01 PROCEDURE — 99284 EMERGENCY DEPT VISIT MOD MDM: CPT | Mod: 25

## 2023-06-01 RX ORDER — PROMETHAZINE HYDROCHLORIDE 25 MG/1
25 TABLET ORAL EVERY 6 HOURS PRN
Qty: 15 TABLET | Refills: 0 | OUTPATIENT
Start: 2023-06-01 | End: 2023-07-19

## 2023-06-01 RX ORDER — KETOROLAC TROMETHAMINE 10 MG/1
10 TABLET, FILM COATED ORAL EVERY 6 HOURS
Qty: 12 TABLET | Refills: 0 | Status: SHIPPED | OUTPATIENT
Start: 2023-06-01 | End: 2023-06-04

## 2023-06-01 RX ORDER — KETOROLAC TROMETHAMINE 30 MG/ML
10 INJECTION, SOLUTION INTRAMUSCULAR; INTRAVENOUS
Status: COMPLETED | OUTPATIENT
Start: 2023-06-01 | End: 2023-06-01

## 2023-06-01 RX ORDER — DIPHENHYDRAMINE HCL 25 MG
25 CAPSULE ORAL EVERY 6 HOURS PRN
Qty: 20 CAPSULE | Refills: 0 | OUTPATIENT
Start: 2023-06-01 | End: 2023-07-19

## 2023-06-01 RX ORDER — DIPHENHYDRAMINE HYDROCHLORIDE 50 MG/ML
12.5 INJECTION INTRAMUSCULAR; INTRAVENOUS
Status: COMPLETED | OUTPATIENT
Start: 2023-06-01 | End: 2023-06-01

## 2023-06-01 RX ADMIN — SODIUM CHLORIDE 1000 ML: 9 INJECTION, SOLUTION INTRAVENOUS at 02:06

## 2023-06-01 RX ADMIN — KETOROLAC TROMETHAMINE 10 MG: 30 INJECTION, SOLUTION INTRAMUSCULAR; INTRAVENOUS at 01:06

## 2023-06-01 RX ADMIN — PROMETHAZINE HYDROCHLORIDE 12.5 MG: 25 INJECTION INTRAMUSCULAR; INTRAVENOUS at 01:06

## 2023-06-01 RX ADMIN — DIPHENHYDRAMINE HYDROCHLORIDE 12.5 MG: 50 INJECTION, SOLUTION INTRAMUSCULAR; INTRAVENOUS at 01:06

## 2023-06-01 NOTE — FIRST PROVIDER EVALUATION
" Emergency Department TeleTriage Encounter Note      CHIEF COMPLAINT    Chief Complaint   Patient presents with    Headache     C/o HA 10/10 x 1 wk no relief with medication. PMH Migraine. -change in vision/n/v. VSS        VITAL SIGNS   Initial Vitals [06/01/23 0953]   BP Pulse Resp Temp SpO2   103/67 65 18 97.9 °F (36.6 °C) 100 %      MAP       --            ALLERGIES    Review of patient's allergies indicates:   Allergen Reactions    Maxalt [rizatriptan] Other (See Comments)     Pt reports " neck stiffness"          PROVIDER TRIAGE NOTE  This is a teletriage evaluation of a 32 y.o. female presenting to the ED with c/o HA for 1 week.  Reports PMH of migraine HAs.  Took her RX migraines (Nurtec) with no relief.  Reports that she normally gets Fioricet when she comes to the ED. Limited physical exam via telehealth: The patient is awake, alert, answering questions appropriately and is not in respiratory distress.  As the Teletriage provider, I performed an initial assessment and ordered appropriate labs and imaging studies, if any, to facilitate the patient's care once placed in the ED. Once a room is available, care and a full evaluation will be completed by an alternate ED provider.  Any additional orders and the final disposition will be determined by that provider.  All imaging and labs will not be followed-up by the Teletriage Team, including myself.          ORDERS  Labs Reviewed   POCT URINE PREGNANCY       ED Orders (720h ago, onward)      Start Ordered     Status Ordering Provider    06/01/23 1002 06/01/23 1002  POCT urine pregnancy  Once         Final result JOSE RAFAEL NICK              Virtual Visit Note: The provider triage portion of this emergency department evaluation and documentation was performed via [employer], a HIPAA-compliant telemedicine application, in concert with a tele-presenter in the room. A face to face patient evaluation with one of my colleagues will occur once the patient is placed in " an emergency department room.      DISCLAIMER: This note was prepared with [employer] voice recognition transcription software. Garbled syntax, mangled pronouns, and other bizarre constructions may be attributed to that software system.

## 2023-06-01 NOTE — TELEPHONE ENCOUNTER
Msg sent to [facility] team for scheduling in [facility]

## 2023-06-01 NOTE — ED PROVIDER NOTES
"Encounter Date: 6/1/2023       History     Chief Complaint   Patient presents with    Headache     C/o HA 10/10 x 1 wk no relief with medication. PMH Migraine. -change in vision/n/v. VSS      Afebrile 32-year-old female with long history of migraine headaches presents the ED for evaluation of headache.  Patient reports that this episode began approximately 1 week ago.  She states that she was formally following with neurologist however due to recent retiring is in the search of a 1.  States that she receives monthly injections for headaches.  States at 1 time it was helpful however she feels that is not therapeutic at this time.  States that she has tried over-the-counter medications with no significant improvement.  Does report visual disturbance yesterday that has since resolved.  States this headache is similar to previous migraines.  Does report some associated nausea and phonophobia.  Denies any numbness, tingling, unilateral weakness, neck rigidity fever or chills.    The history is provided by the patient.   Review of patient's allergies indicates:   Allergen Reactions    Maxalt [rizatriptan] Other (See Comments)     Pt reports " neck stiffness"        Past Medical History:   Diagnosis Date    Migraine headache      Past Surgical History:   Procedure Laterality Date    DILATION AND CURETTAGE OF UTERUS      VAGINAL DELIVERY  12/19/2018     Family History   Problem Relation Age of Onset    No Known Problems Mother     No Known Problems Father     No Known Problems Maternal Aunt     No Known Problems Paternal Aunt     No Known Problems Maternal Grandmother     No Known Problems Paternal Grandmother      Social History     Tobacco Use    Smoking status: Never    Smokeless tobacco: Never   Substance Use Topics    Alcohol use: No    Drug use: No     Review of Systems  See HPI  Physical Exam     Initial Vitals [06/01/23 0953]   BP Pulse Resp Temp SpO2   103/67 65 18 97.9 °F (36.6 °C) 100 %      MAP       --     "     Physical Exam    Nursing note and vitals reviewed.  Constitutional: Vital signs are normal. She appears well-developed and well-nourished. She is cooperative.  Non-toxic appearance. She does not appear ill. She appears distressed (In mild distress).   HENT:   Head: Normocephalic and atraumatic.   Eyes: Conjunctivae, EOM and lids are normal. Pupils are equal, round, and reactive to light.   Normal finger-to-nose   Neck: Neck supple.   Normal range of motion.  Cardiovascular:  Normal rate and regular rhythm.           Pulmonary/Chest: Breath sounds normal. No respiratory distress. She has no wheezes. She has no rhonchi.   Abdominal: Abdomen is soft. Bowel sounds are normal. There is no abdominal tenderness. There is no rigidity and no guarding.   Musculoskeletal:         General: Normal range of motion.      Cervical back: Normal range of motion and neck supple. No rigidity.     Neurological: She is alert and oriented to person, place, and time. She has normal strength. No sensory deficit. GCS score is 15. GCS eye subscore is 4. GCS verbal subscore is 5. GCS motor subscore is 6.   Smooth steady gait.  No focal neuro deficit   Skin: Skin is warm, dry and intact. No rash noted.   Psychiatric: She has a normal mood and affect. Her speech is normal and behavior is normal. Thought content normal.       ED Course   Procedures  Labs Reviewed   POCT URINE PREGNANCY          Imaging Results    None          Medications   ketorolac injection 9.999 mg (9.999 mg Intravenous Given 6/1/23 1315)   diphenhydrAMINE injection 12.5 mg (12.5 mg Intravenous Given 6/1/23 1313)   promethazine (PHENERGAN) 12.5 mg in dextrose 5 % (D5W) 50 mL IVPB (0 mg Intravenous Stopped 6/1/23 1334)   sodium chloride 0.9% bolus 1,000 mL 1,000 mL (1,000 mLs Intravenous New Bag 6/1/23 1453)     Medical Decision Making:   History:   Old Medical Records: I decided to obtain old medical records.  Initial Assessment:   Emergent evaluation 32-year-old female  with history of migraines presenting with headache.  Describes it similar to previous migraines.  Associated nausea and phonophobia.  She appears in mild distress however nontoxic.  No focal neuro deficit today.  Plan for migraine cocktail, IV fluids and reassess.  Will reach out to case management to help establish with new neurologist.  Differential Diagnosis:   Differential Diagnosis includes, but is not limited to:  Ischemic stroke, hemorrhagic stroke, subarachnoid hemorrhage/ruptured aneurysm, intracranial lesion/mass, meningitis/encephalitis, epidural hematoma, subdural hematoma, pseudotumor cerebri, venous sinus thrombosis, CO poisoning, hypertensive encephalopathy, MI/ACS, head trauma/contusion, concussion, sinus headache, dehydration, anxiety, medication non-compliance, primary headache (tension/cluster/migraine).    Clinical Tests:   Lab Tests: Reviewed and Ordered  ED Management:  On reassessment she reported improvement headache.  Continues to remain nontoxic with stable vitals. Patient has no neck stiffness/meningismus, fever, elevated blood pressure, confusion, vision loss, temporal artery tenderness, rash, vomiting, photophobia or thunderclap onset to suggest pseudotumor cerebri, meningitis, tumor, ICH, temporal arteritis, or encephalitis. We will send home with symptomatic medications.  Discuss also the possible benefit of acupuncture as tension could be a trigger.  She is agreeable will place referral.  Will also place external referral for Neurology.  Case management worker did fax to Neurology as the weight within the [facility] system was several months. Strict instructions to follow up with primary care physician or reference provided for further assessment and evaluation. Given instructions to return for any acute symptoms and verbalized understanding of this medical plan.                          Clinical Impression:   Final diagnoses:  [G43.801] Other migraine with status migrainosus, not  intractable (Primary)        ED Disposition Condition    Discharge Stable          ED Prescriptions       Medication Sig Dispense Start Date End Date Auth. Provider    ketorolac (TORADOL) 10 mg tablet Take 1 tablet (10 mg total) by mouth every 6 (six) hours. for 3 days 12 tablet 6/1/2023 6/4/2023 ANGELIKA Cardenas    diphenhydrAMINE (BENADRYL) 25 mg capsule Take 1 capsule (25 mg total) by mouth every 6 (six) hours as needed for Itching (headaches). 20 capsule 6/1/2023 -- ANGELIKA Cardenas    promethazine (PHENERGAN) 25 MG tablet Take 1 tablet (25 mg total) by mouth every 6 (six) hours as needed for Nausea. 15 tablet 6/1/2023 -- ANGELIKA Cardenas          Follow-up Information       Follow up With Specialties Details Why Contact Info    Henrik Galindo MD Obstetrics, Obstetrics and Gynecology Schedule an appointment as soon as possible for a visit   [address] [place] [address]  MARIA [facility]ADALUPE [facility]  [facility] [address]  [phone]      [facility] Surgical Oncology, Orthopedic Surgery, Genetics, Physical Medicine and Rehabilitation, Occupational Therapy, Radiology Schedule an appointment as soon as possible for a visit   [address] [facility] [place] [address]  [phone]               ANGELIKA Cardenas  06/02/23 0991

## 2023-06-01 NOTE — ED TRIAGE NOTES
C/o daily headache x 1 week with nausea today. No fever or h/o injury. States h/o migraines in past - takes prescribed meds with some relief but states that the meds are not as effective as they once were. + sinus congestion over past few days. No sick contacts. Presents in no distress.

## 2023-06-01 NOTE — PLAN OF CARE
Jeremiah faxed referral to [facility]. Patient will be contacted about her appt.      [facility]  [phone] fax

## 2023-06-01 NOTE — Clinical Note
"Vivian"Didier Moran was seen and treated in our emergency department on 6/1/2023.  She may return to work on 06/03/2023.       If you have any questions or concerns, please don't hesitate to call.      ANGELIKA Cardenas"

## 2023-06-02 ENCOUNTER — TELEPHONE (OUTPATIENT)
Dept: NEUROLOGY | Facility: CLINIC | Age: 33
End: 2023-06-02
Payer: MEDICAID

## 2023-06-02 ENCOUNTER — PATIENT OUTREACH (OUTPATIENT)
Dept: EMERGENCY MEDICINE | Facility: OTHER | Age: 33
End: 2023-06-02

## 2023-06-02 NOTE — TELEPHONE ENCOUNTER
----- Message from Barbara Welsh RN sent at 6/1/2023  3:35 PM CDT -----  Regarding: ER follow up  Contact: [phone]  Hello, team,    Please schedule HFU/ED follow up at campus in which pt was treated.    Thanks,    Barbara  ----- Message -----  From: Jose Jeffery MA  Sent: 6/1/2023   2:03 PM CDT  To: Barbara Welsh RN, #    Phyliss from [facility] is calling to schedule appt for pt.  Patient access attempted to schedule from urgent referral, but no appts are available  [facility] to reach out to the pt at [phone]

## 2023-06-09 ENCOUNTER — CLINICAL SUPPORT (OUTPATIENT)
Dept: REHABILITATION | Facility: HOSPITAL | Age: 33
End: 2023-06-09
Payer: MEDICAID

## 2023-06-09 DIAGNOSIS — G43.801 OTHER MIGRAINE WITH STATUS MIGRAINOSUS, NOT INTRACTABLE: ICD-10-CM

## 2023-06-09 PROCEDURE — 97811 ACUP 1/> W/O ESTIM EA ADD 15: CPT | Mod: PN | Performed by: ACUPUNCTURIST

## 2023-06-09 PROCEDURE — 97810 ACUP 1/> WO ESTIM 1ST 15 MIN: CPT | Mod: PN | Performed by: ACUPUNCTURIST

## 2023-06-09 NOTE — PROGRESS NOTES
"  Acupuncture Evaluation Note     Name: Vivian Moran  Clinic Number: [id number]    Traditional Chinese Medicine (TCM) Diagnosis: Qi Stagnation, Blood Deficiency, and Damp  Medical Diagnosis:   Encounter Diagnosis   Name Primary?    Other migraine with status migrainosus, not intractable         Evaluation Date: 6/9/2023    Visit #/Visits authorized: 1/12     Precautions: Standard    Subjective     Chief Concern: Migraines, orbital (left) and frontal like a tight band wrapped around the head. Increasing in frequency since 2020.     Medical necessity is demonstrated by the following IMPAIRMENTS: Medical Necessity: Decreased mobility limits day to day activities, social, and emergent situations              Aggravating Factors:  stress   Relieving Factors:  rest    Symptom Description:     Quality:  Throbbing and Tight  Severity:  10  Frequency:  every day    Previous Treatments Tried:  Medication    HEENT:  Migraines. Vision obstructed with migraines, flashing lights. Has tried cartilage piercing in inner for migraines, helping with intensity of pain.     Chest:  no complaints    Digestion:     Diet: in general, a "healthy" diet     Fluids: coffee 1 /day, is drinking moderate amounts of fluids, social drinker  Taste/Appetite: good   Symptoms: abdominal pain, bloating, and constipation    Sleep: restless, wakes up through the night, trouble falling asleep at night    Energy Levels:  good    Psychological Symptoms:  anxiety     Other Symptoms: nausea with medicine    GYN Symptoms: no complaints    Objective     Observation:     Tongue:      Body:  scalloped, swollen, and swollen edges   Color:  pink   Coating:  thin,     Pulse:        wiry and slippery       New Findings:      Treatment     Treatment Principles:  Move qi, nourish blood, stop pain    Acupuncture points used:      Bilateral points: LI11, LI4, LV3, SP6, ST36  Unilateral points: GB34, GB41, SP9, SP4, PC6, LU7  Auricular Treatment:      Needles In: " 16  [place] Out: 16  Needles W/O STIM placed: 9:55  Needles W/O STIM removed: 10:20      Other Traditional Chinese Medicine Modalities - [place]    Assessment     After treatment, patient felt good     Patient prognosis is Good.     Patient will continue to benefit from acupuncture treatment to address the deficits listed in the problem list box on initial evaluation, provide patient family education and to maximize pt's level of independence in the home and community environment.     Patient's spiritual, cultural and educational needs considered and pt agreeable to plan of care and goals.     Anticipated barriers to treatment: none    Plan     Recommend 1 /week for 6 sessions then re-assess.      Education:  Patient is aware of cumulative benefit of acupuncture

## 2023-06-23 ENCOUNTER — CLINICAL SUPPORT (OUTPATIENT)
Dept: REHABILITATION | Facility: HOSPITAL | Age: 33
End: 2023-06-23
Payer: MEDICAID

## 2023-06-23 DIAGNOSIS — G43.801 OCULAR MIGRAINE WITH STATUS MIGRAINOSUS, NOT INTRACTABLE: Primary | ICD-10-CM

## 2023-06-23 PROCEDURE — 97811 ACUP 1/> W/O ESTIM EA ADD 15: CPT | Mod: PN | Performed by: ACUPUNCTURIST

## 2023-06-23 PROCEDURE — 97810 ACUP 1/> WO ESTIM 1ST 15 MIN: CPT | Mod: PN | Performed by: ACUPUNCTURIST

## 2023-06-23 NOTE — PROGRESS NOTES
Acupuncture Evaluation Note     Name: Vivian Moran  Clinic Number: [id number]    Traditional Chinese Medicine (TCM) Diagnosis: Qi Stagnation and Blood Deficiency  Medical Diagnosis: No diagnosis found.     Evaluation Date: 6/23/2023    Visit #/Visits authorized: 2/12     Precautions: Standard    Subjective     Chief Concern: Migraines, left side, orbital and frontal.     Medical necessity is demonstrated by the following IMPAIRMENTS: Medical Necessity: Decreased mobility limits day to day activities, social, and emergent situations and Decreased quality of life              Aggravating Factors:  stress   Relieving Factors:  rest    Symptom Description:     Quality:  Throbbing and Tight  Severity:  8  Frequency:   Often, with stress    Treatment     Treatment Principles:  Harmonize qi, nourish blood, stop pain    Acupuncture points used:      Bilateral points: LI11, LI4, ST36, SP9, SP6, LV3  Unilateral points: LU7, HT6, SP4, KD6, GB41, PC6  Auricular Treatment:  lundberg men and point zero    Needles In: 20  Needles Out: 20  Needles W/O STIM placed: 9:25  Needles W/O STIM removed: 9:50        Assessment     After treatment, patient felt less occurrence of migraines, only 1 since last treatment with less severity.      Patient prognosis is Good.     Patient will continue to benefit from acupuncture treatment to address the deficits listed in the problem list box on initial evaluation, provide patient family education and to maximize pt's level of independence in the home and community environment.     Patient's spiritual, cultural and educational needs considered and pt agreeable to plan of care and goals.     Anticipated barriers to treatment: none    Plan     Recommend 1 /week for 5 sessions then re-assess.      Education:  Patient is aware of cumulative benefit of acupuncture

## 2023-06-28 ENCOUNTER — TELEPHONE (OUTPATIENT)
Dept: NEUROLOGY | Facility: CLINIC | Age: 33
End: 2023-06-28
Payer: MEDICAID

## 2023-06-28 NOTE — TELEPHONE ENCOUNTER
Pt will call us back.   ----- Message from Barbara Welsh RN sent at 6/28/2023 10:14 AM CDT -----  Regarding: [facility] ED HFU  Contact: [phone]  Pt needs Neuro F/u after [facility] ED visit  ----- Message -----  From: Jose Jeffery MA  Sent: 6/1/2023   2:03 PM CDT  To: Barbara Welsh RN, #    Phyliss from [facility] is calling to schedule appt for pt.  Patient access attempted to schedule from urgent referral, but no appts are available  [facility] to reach out to the pt at [phone]

## 2023-06-30 ENCOUNTER — CLINICAL SUPPORT (OUTPATIENT)
Dept: REHABILITATION | Facility: HOSPITAL | Age: 33
End: 2023-06-30
Payer: MEDICAID

## 2023-06-30 DIAGNOSIS — G43.801 OTHER MIGRAINE WITH STATUS MIGRAINOSUS, NOT INTRACTABLE: Primary | ICD-10-CM

## 2023-06-30 PROCEDURE — 97810 ACUP 1/> WO ESTIM 1ST 15 MIN: CPT | Mod: PN | Performed by: ACUPUNCTURIST

## 2023-06-30 PROCEDURE — 97811 ACUP 1/> W/O ESTIM EA ADD 15: CPT | Mod: PN | Performed by: ACUPUNCTURIST

## 2023-06-30 NOTE — PROGRESS NOTES
Acupuncture Evaluation Note     Name: Vivian Moran  Clinic Number: [id number]    Traditional Chinese Medicine (TCM) Diagnosis: Blood Stasis and Qi Deficiency  Medical Diagnosis: No diagnosis found.     Evaluation Date: 6/30/2023    Visit #/Visits authorized: 3/12     Precautions: Standard    Subjective     Chief Concern: 3 migraines this past week, starting on left side. [place]    Medical necessity is demonstrated by the following IMPAIRMENTS: Medical Necessity: Decreased mobility limits day to day activities, social, and emergent situations and Decreased quality of life              Aggravating Factors:  stress   Relieving Factors:  rest    Symptom Description:     Quality:  Tight  Severity:  8  Frequency:   3x/week    Treatment     Treatment Principles:  Harmonize and nourish qi, course blood, stop pain.     Acupuncture points used:      Bilateral points: LI11, LI4, SP9, SP6, LV3  Unilateral points: LU7, SP4, PC6, KD6, GD41, GB34, ST36, ST40  Auricular Treatment:  lundberg men and point zero    Needles In: 20  Needles Out: 20  Needles W/O STIM placed: 9:25  Needles W/O STIM removed: 9:50        Assessment     After treatment, patient felt relaxed     Patient prognosis is Good.     Patient will continue to benefit from acupuncture treatment to address the deficits listed in the problem list box on initial evaluation, provide patient family education and to maximize pt's level of independence in the home and community environment.     Patient's spiritual, cultural and educational needs considered and pt agreeable to plan of care and goals.     Anticipated barriers to treatment: none    Plan     Recommend 1 /week for 4 sessions then re-assess.      Education:  Patient is aware of cumulative benefit of acupuncture

## 2023-07-07 ENCOUNTER — CLINICAL SUPPORT (OUTPATIENT)
Dept: REHABILITATION | Facility: HOSPITAL | Age: 33
End: 2023-07-07
Payer: MEDICAID

## 2023-07-07 DIAGNOSIS — G43.801 OTHER MIGRAINE WITH STATUS MIGRAINOSUS, NOT INTRACTABLE: Primary | ICD-10-CM

## 2023-07-07 PROCEDURE — 97810 ACUP 1/> WO ESTIM 1ST 15 MIN: CPT | Mod: PN | Performed by: ACUPUNCTURIST

## 2023-07-07 PROCEDURE — 97811 ACUP 1/> W/O ESTIM EA ADD 15: CPT | Mod: PN | Performed by: ACUPUNCTURIST

## 2023-07-07 NOTE — PROGRESS NOTES
Acupuncture Evaluation Note     Name: Vivian Moran  Clinic Number: [id number]    Traditional Chinese Medicine (TCM) Diagnosis: Qi Stagnation and Blood Deficiency  Medical Diagnosis: No diagnosis found.     Evaluation Date: 7/7/2023    Visit #/Visits authorized: 4/12    Precautions: Standard    Subjective     Chief Concern: Migraines, 2 this week. Stomach pains with cramping and bloating. Slight constipation, BM every other day and difficult to pass.       Medical necessity is demonstrated by the following IMPAIRMENTS: Medical Necessity: Decreased quality of life              Aggravating Factors:  stress   Relieving Factors:  rest    Symptom Description:     Quality:  Tight  Severity:  8  Frequency:  several times per week    Treatment     Treatment Principles:  Harmonize qi, nourish blood, strengthen spleen to transform dampness, stop pain.     Acupuncture points used:      Bilateral points: LI11, LI4, ST36, SP9, SP6, LV3, ST25  Unilateral points: LU7, SP4, KD6, PC6, GB41, GB34, CV6, CV12, CV14  Auricular Treatment:      Needles In: 23  Needles Out: 23  Needles W/O STIM placed: 9:45  Needles W/O STIM removed: 10:20        Assessment     After treatment, patient felt less migraines occurred with less severe pain.     Patient prognosis is Good.     Patient will continue to benefit from acupuncture treatment to address the deficits listed in the problem list box on initial evaluation, provide patient family education and to maximize pt's level of independence in the home and community environment.     Patient's spiritual, cultural and educational needs considered and pt agreeable to plan of care and goals.     Anticipated barriers to treatment: none    Plan     Recommend 1 /week for 3 sessions then re-assess.      Education:  Patient is aware of cumulative benefit of acupuncture

## 2023-07-14 ENCOUNTER — CLINICAL SUPPORT (OUTPATIENT)
Dept: REHABILITATION | Facility: HOSPITAL | Age: 33
End: 2023-07-14
Payer: MEDICAID

## 2023-07-14 DIAGNOSIS — G43.801 OTHER MIGRAINE WITH STATUS MIGRAINOSUS, NOT INTRACTABLE: Primary | ICD-10-CM

## 2023-07-14 PROCEDURE — 97810 ACUP 1/> WO ESTIM 1ST 15 MIN: CPT | Mod: PN | Performed by: ACUPUNCTURIST

## 2023-07-14 PROCEDURE — 97811 ACUP 1/> W/O ESTIM EA ADD 15: CPT | Mod: PN | Performed by: ACUPUNCTURIST

## 2023-07-14 NOTE — PROGRESS NOTES
Acupuncture Evaluation Note     Name: Vivian Moran  Clinic Number: [id number]    Traditional Chinese Medicine (TCM) Diagnosis: Qi Stagnation, Blood Deficiency, and Damp  Medical Diagnosis:   Encounter Diagnosis   Name Primary?    Other migraine with status migrainosus, not intractable Yes        Evaluation Date: 7/14/2023    Visit #/Visits authorized: 5/12     Precautions: Standard    Subjective     Chief Concern: No migraines or headaches this week. Currently experiencing digestive difficulties reminiscent of gallstones, with pain and cramping along mikayla line and loose stools after eating. Patient reports having acne outbreak for last month, cheeks and temples, painful and pustules.     Medical necessity is demonstrated by the following IMPAIRMENTS: Medical Necessity: Decreased quality of life              Aggravating Factors:  stress   Relieving Factors:  rest    Symptom Description:     Quality:  Tight  Severity:  3  Frequency:  every day    Treatment     Treatment Principles:  Harmonize GB and ST qi, descend ST qi, strengthen middle burner to drain damp, nourish LV blood, stop pain    Acupuncture points used:      Bilateral points: LI11, LI4, GB34, ST36, SP9, SP6, LV3  Unilateral points: GB41, HT7, SP4, PC6, CV12, CV17, LU7, KD6  Auricular Treatment:      Needles In: 22  Needles Out: 22  Needles W/O STIM placed: 1:25  Needles W/O STIM removed: 1:50        Assessment     After treatment, patient felt better, no migraines reported this week    Patient prognosis is Good.     Patient will continue to benefit from acupuncture treatment to address the deficits listed in the problem list box on initial evaluation, provide patient family education and to maximize pt's level of independence in the home and community environment.     Patient's spiritual, cultural and educational needs considered and pt agreeable to plan of care and goals.     Anticipated barriers to treatment: none    Plan     Recommend 1 /week for 2  sessions then re-assess.      Education:  Patient is aware of cumulative benefit of acupuncture

## 2023-07-19 ENCOUNTER — HOSPITAL ENCOUNTER (EMERGENCY)
Facility: OTHER | Age: 33
Discharge: HOME | End: 2023-07-19
Attending: EMERGENCY MEDICINE
Payer: MEDICAID

## 2023-07-19 VITALS
WEIGHT: 140 LBS | DIASTOLIC BLOOD PRESSURE: 60 MMHG | TEMPERATURE: 99 F | BODY MASS INDEX: 23.32 KG/M2 | SYSTOLIC BLOOD PRESSURE: 99 MMHG | HEART RATE: 56 BPM | RESPIRATION RATE: 18 BRPM | OXYGEN SATURATION: 99 % | HEIGHT: 65 IN

## 2023-07-19 DIAGNOSIS — G43.901 MIGRAINE WITH STATUS MIGRAINOSUS, NOT INTRACTABLE, UNSPECIFIED MIGRAINE TYPE: Primary | ICD-10-CM

## 2023-07-19 LAB
B-HCG UR QL: NEGATIVE
CTP QC/QA: YES

## 2023-07-19 PROCEDURE — 96374 THER/PROPH/DIAG INJ IV PUSH: CPT

## 2023-07-19 PROCEDURE — 25000003 PHARM REV CODE 250: Performed by: PHYSICIAN ASSISTANT

## 2023-07-19 PROCEDURE — 81025 URINE PREGNANCY TEST: CPT | Performed by: PHYSICIAN ASSISTANT

## 2023-07-19 PROCEDURE — 96372 THER/PROPH/DIAG INJ SC/IM: CPT | Performed by: PHYSICIAN ASSISTANT

## 2023-07-19 PROCEDURE — 96375 TX/PRO/DX INJ NEW DRUG ADDON: CPT

## 2023-07-19 PROCEDURE — 63600175 PHARM REV CODE 636 W HCPCS: Performed by: PHYSICIAN ASSISTANT

## 2023-07-19 PROCEDURE — 96361 HYDRATE IV INFUSION ADD-ON: CPT

## 2023-07-19 PROCEDURE — 99284 EMERGENCY DEPT VISIT MOD MDM: CPT | Mod: 25

## 2023-07-19 RX ORDER — PROCHLORPERAZINE EDISYLATE 5 MG/ML
5 INJECTION INTRAMUSCULAR; INTRAVENOUS
Status: COMPLETED | OUTPATIENT
Start: 2023-07-19 | End: 2023-07-19

## 2023-07-19 RX ORDER — DIPHENHYDRAMINE HYDROCHLORIDE 50 MG/ML
12.5 INJECTION INTRAMUSCULAR; INTRAVENOUS
Status: DISCONTINUED | OUTPATIENT
Start: 2023-07-19 | End: 2023-07-19

## 2023-07-19 RX ORDER — IBUPROFEN 600 MG/1
600 TABLET ORAL EVERY 6 HOURS PRN
Qty: 20 TABLET | Refills: 0 | Status: SHIPPED | OUTPATIENT
Start: 2023-07-19

## 2023-07-19 RX ORDER — DIPHENHYDRAMINE HYDROCHLORIDE 50 MG/ML
12.5 INJECTION INTRAMUSCULAR; INTRAVENOUS
Status: COMPLETED | OUTPATIENT
Start: 2023-07-19 | End: 2023-07-19

## 2023-07-19 RX ORDER — KETOROLAC TROMETHAMINE 30 MG/ML
15 INJECTION, SOLUTION INTRAMUSCULAR; INTRAVENOUS
Status: COMPLETED | OUTPATIENT
Start: 2023-07-19 | End: 2023-07-19

## 2023-07-19 RX ORDER — DIPHENHYDRAMINE HCL 25 MG
25 CAPSULE ORAL EVERY 6 HOURS PRN
Qty: 20 CAPSULE | Refills: 0 | Status: SHIPPED | OUTPATIENT
Start: 2023-07-19

## 2023-07-19 RX ORDER — PROCHLORPERAZINE EDISYLATE 5 MG/ML
5 INJECTION INTRAMUSCULAR; INTRAVENOUS
Status: DISCONTINUED | OUTPATIENT
Start: 2023-07-19 | End: 2023-07-19

## 2023-07-19 RX ORDER — KETOROLAC TROMETHAMINE 30 MG/ML
15 INJECTION, SOLUTION INTRAMUSCULAR; INTRAVENOUS
Status: DISCONTINUED | OUTPATIENT
Start: 2023-07-19 | End: 2023-07-19

## 2023-07-19 RX ORDER — PROMETHAZINE HYDROCHLORIDE 25 MG/1
25 TABLET ORAL EVERY 6 HOURS PRN
Qty: 15 TABLET | Refills: 0 | Status: SHIPPED | OUTPATIENT
Start: 2023-07-19

## 2023-07-19 RX ADMIN — PROCHLORPERAZINE EDISYLATE 5 MG: 5 INJECTION INTRAMUSCULAR; INTRAVENOUS at 01:07

## 2023-07-19 RX ADMIN — KETOROLAC TROMETHAMINE 15 MG: 30 INJECTION, SOLUTION INTRAMUSCULAR; INTRAVENOUS at 01:07

## 2023-07-19 RX ADMIN — DIPHENHYDRAMINE HYDROCHLORIDE 12.5 MG: 50 INJECTION, SOLUTION INTRAMUSCULAR; INTRAVENOUS at 01:07

## 2023-07-19 RX ADMIN — SODIUM CHLORIDE 1000 ML: 9 INJECTION, SOLUTION INTRAVENOUS at 01:07

## 2023-07-19 NOTE — ED PROVIDER NOTES
"     Source of History:  Patient and medical chart    Chief complaint:  Migraine (X 3 days. No relief with prescribed meds. )      HPI:  Vivian Moran is a 32 y.o. female presenting with headache.  Patient reports known history of headache and that this headache feels similar to previous migraines.  States this 1 has been more persistent for the past 3 days.  Her typical abortive medications are no longer helping.  She denies any nausea or vomiting.  She denies any visual acuity changes.  She attempted follow-up with neurology however was unable to obtain appointment within the [facility] system.  She reports some improvement symptoms with acupuncture.    This is the extent to the patients complaints today here in the emergency department.    ROS: As per HPI and below:  Constitutional: No fever.  No chills.  Eyes: No visual changes.  ENT: No sore throat. No ear pain    Cardiovascular: No chest pain.  Respiratory: No shortness of breath.  GI: No abdominal pain.  No nausea or vomiting.  Genitourinary: No abnormal urination.  Neurologic: + headache. No focal weakness.  No numbness.  MSK: no back pain.  Integument: No rashes or lesions.  Hematologic: No easy bruising.  Endocrine: No excessive thirst or urination.    Review of patient's allergies indicates:   Allergen Reactions    Maxalt [rizatriptan] Other (See Comments)     Pt reports " neck stiffness"          PMH:  As per HPI and below:  Past Medical History:   Diagnosis Date    Migraine headache      Past Surgical History:   Procedure Laterality Date    DILATION AND CURETTAGE OF UTERUS      VAGINAL DELIVERY  12/19/2018       Social History     Tobacco Use    Smoking status: Never    Smokeless tobacco: Never   Substance Use Topics    Alcohol use: No    Drug use: No       Physical Exam:    BP 99/60 (BP Location: Left arm, Patient Position: Lying)   Pulse (!) 56   Temp 98.5 °F (36.9 °C) (Oral)   Resp 18   Ht 5' 5" (1.651 m)   Wt 63.5 kg (140 lb)   SpO2 99%   " BMI 23.30 kg/m²   Nursing note and vital signs reviewed.  Constitutional: No acute distress.  Nontoxic  Eyes: No conjunctival injection.Extraocular muscles are intact.  ENT: Oropharynx clear.  Normal phonation.   Cardiovascular: Regular rate and rhythm.    Respiratory: Clear to auscultation bilaterally.  Good air movement.  No wheezes.  No rhonchi. No rales. No accessory muscle use..  Abdomen: Soft.  Not distended.  Nontender.  No guarding.  No rebound. Non-peritoneal.  Musculoskeletal: Good range of motion all joints.  No deformities.  Neck supple.  No meningismus.  Skin: No rashes seen.  Good turgor.  No abrasions.  No ecchymoses.  Neurologic: Motor intact.  Sensation intact.  No ataxia. No focal neurological deficits.  Psych: Appropriate, conversant    Labs that have been ordered have been independently reviewed and interpreted by myself.    I decided to obtain the patient's medical records.      MDM/ Differential Dx:    Vivian Moran 32 y.o. presented to the ED with c/o headache. Physical exam reveals patient nontoxic in no significant distress.  No focal neuro deficit.  Ambulates with smooth steady gait.  No abnormal HEENT exam findings at this time.  No meningismus.      Differential Diagnosis includes, but is not limited to:  Ischemic stroke, hemorrhagic stroke, subarachnoid hemorrhage/ruptured aneurysm, intracranial lesion/mass, meningitis/encephalitis, epidural hematoma, subdural hematoma, pseudotumor cerebri, venous sinus thrombosis, CO poisoning, hypertensive encephalopathy, MI/ACS, head trauma/contusion, concussion, sinus headache, dehydration, anxiety, medication non-compliance, primary headache (tension/cluster/migraine).      ED management:  Initial medications ordered from tele triage.  As she does report some decreased intake will also order IV fluids.  Placed in dark room.  On reassessments she continues to rest quietly with reported improvement headache.  Patient felt comfortable going home.   Will send home with additional prescription for abortive medicines.  Have reach out to social work and they have faxed external referral she was not able to obtain appointment within the [facility] system.    Impression/Plan: Patient informed of diagnosis  The encounter diagnosis was Migraine with status migrainosus, not intractable, unspecified migraine type. Patient will follow up with Primary.  Patient cautioned on when to return to ED.  Pt. Understands and agrees with current treatment plan      Results for orders placed or performed during the hospital encounter of 07/19/23   POCT urine pregnancy   Result Value Ref Range    POC Preg Test, Ur Negative Negative     Acceptable Yes      Imaging Results    None                   Diagnostic Impression:    1. Migraine with status migrainosus, not intractable, unspecified migraine type         ED Disposition Condition    Discharge Stable            ED Prescriptions       Medication Sig Dispense Start Date End Date Auth. Provider    ibuprofen (ADVIL,MOTRIN) 600 MG tablet Take 1 tablet (600 mg total) by mouth every 6 (six) hours as needed for Pain. 20 tablet 7/19/2023 -- ANGELIKA Cardenas    promethazine (PHENERGAN) 25 MG tablet Take 1 tablet (25 mg total) by mouth every 6 (six) hours as needed for Nausea. 15 tablet 7/19/2023 -- ANGELIKA Cardenas    diphenhydrAMINE (BENADRYL) 25 mg capsule Take 1 capsule (25 mg total) by mouth every 6 (six) hours as needed for Itching or Allergies. 20 capsule 7/19/2023 -- ANGELIKA Cardenas          Follow-up Information       Follow up With Specialties Details Why Contact Info Additional Information    [facility] - Nephrology [facility] Schedule an appointment as soon as possible for a visit   [address]  [facility] [address] [phone] Nephrology - Main Building, 5th Floor Please park in [facility] - Neurology/Ms/Stroke  Neurology Schedule an appointment as soon as possible for a visit   [address] [place] [id number]  [phone]                ANGELIKA Cardenas  07/20/23 0802

## 2023-07-19 NOTE — Clinical Note
"Vivian "Didier Moran was seen and treated in our emergency department on 7/19/2023.  She may return to work on 07/22/2023.       If you have any questions or concerns, please don't hesitate to call.      ANGELIKA Cardenas"

## 2023-07-19 NOTE — FIRST PROVIDER EVALUATION
" Emergency Department TeleTriage Encounter Note      CHIEF COMPLAINT    Chief Complaint   Patient presents with    Migraine     X 3 days. No relief with prescribed meds.        VITAL SIGNS   Initial Vitals [07/19/23 1145]   BP Pulse Resp Temp SpO2   100/63 77 16 -- 100 %      MAP       --            ALLERGIES    Review of patient's allergies indicates:   Allergen Reactions    Maxalt [rizatriptan] Other (See Comments)     Pt reports " neck stiffness"          PROVIDER TRIAGE NOTE  Patient presents with migraine in the exact pattern of her usual migraines, but this has lasted 3 days. She states she needs the "cocktail" to break it.       ORDERS  Labs Reviewed - No data to display    ED Orders (720h ago, onward)      None              Virtual Visit Note: The provider triage portion of this emergency department evaluation and documentation was performed via [employer], a HIPAA-compliant telemedicine application, in concert with a tele-presenter in the room. A face to face patient evaluation with one of my colleagues will occur once the patient is placed in an emergency department room.      DISCLAIMER: This note was prepared with [employer] voice recognition transcription software. Garbled syntax, mangled pronouns, and other bizarre constructions may be attributed to that software system.    "

## 2023-07-19 NOTE — ED NOTES
Pt offered a cold towel to place on head/eyes while waiting, pt accepted. Once brought to her pt denied towel for assistance in relief of symptoms.

## 2023-07-19 NOTE — ED TRIAGE NOTES
"Kamial Dashawn Moran, an 32 y.o. female presents to the ED c.o. migraine for the past 3 days and nausea that started today. Pt reports no relief with prescription meds. Hx of migraines       Chief Complaint   Patient presents with    Migraine     X 3 days. No relief with prescribed meds.      Review of patient's allergies indicates:   Allergen Reactions    Maxalt [rizatriptan] Other (See Comments)     Pt reports " neck stiffness"        Past Medical History:   Diagnosis Date    Migraine headache       "

## 2023-07-20 ENCOUNTER — CLINICAL SUPPORT (OUTPATIENT)
Dept: REHABILITATION | Facility: HOSPITAL | Age: 33
End: 2023-07-20
Payer: MEDICAID

## 2023-07-20 DIAGNOSIS — G43.801 OTHER MIGRAINE WITH STATUS MIGRAINOSUS, NOT INTRACTABLE: Primary | ICD-10-CM

## 2023-07-20 PROCEDURE — 97811 ACUP 1/> W/O ESTIM EA ADD 15: CPT | Mod: PN | Performed by: ACUPUNCTURIST

## 2023-07-20 PROCEDURE — 97810 ACUP 1/> WO ESTIM 1ST 15 MIN: CPT | Mod: PN | Performed by: ACUPUNCTURIST

## 2023-07-20 NOTE — PROGRESS NOTES
Acupuncture Evaluation Note     Name: Vivian Moran  Clinic Number: [id number]    Traditional Chinese Medicine (TCM) Diagnosis: Qi Stagnation, Blood Deficiency, and Liver [place]  Medical Diagnosis: No diagnosis found.     Evaluation Date: 7/20/2023    Visit #/Visits authorized: 6/12     Precautions: Standard    Subjective     Chief Concern: 3 migraines this past week, Monday, Tuesday and Wednesday, upon waking up in the AM. Behind eyes and left side temples and forehead.     Medical necessity is demonstrated by the following IMPAIRMENTS: Medical Necessity: Decreased quality of life              Aggravating Factors:  stress   Relieving Factors:  rest    Symptom Description:     Quality:  Tight  Severity:  3  Frequency:  every day    Treatment     Treatment Principles:  Harmonize GB and ST qi, nourish and move blood, descend LV qi, stop pain    Acupuncture points used:      Bilateral points: LI11, LI4, PC6, SP4, GB41, ST36, SP6, SP9, [place]  Unilateral points:  Auricular Treatment:      Needles In: 18  Needles Out: 18  Needles W/O STIM placed: 10:45  Needles W/O STIM removed: 11:30        Assessment     After treatment, patient felt some pressure on right side of head     Patient prognosis is Good.     Patient will continue to benefit from acupuncture treatment to address the deficits listed in the problem list box on initial evaluation, provide patient family education and to maximize pt's level of independence in the home and community environment.     Patient's spiritual, cultural and educational needs considered and pt agreeable to plan of care and goals.     Anticipated barriers to treatment: none    Plan     Recommend 1 /week for 1 sessions then re-assess.      Education:  Patient is aware of cumulative benefit of acupuncture

## 2023-07-28 ENCOUNTER — CLINICAL SUPPORT (OUTPATIENT)
Dept: REHABILITATION | Facility: HOSPITAL | Age: 33
End: 2023-07-28
Payer: MEDICAID

## 2023-07-28 DIAGNOSIS — G43.801 OTHER MIGRAINE WITH STATUS MIGRAINOSUS, NOT INTRACTABLE: Primary | ICD-10-CM

## 2023-07-28 PROCEDURE — 97814 ACUP 1/> W/ESTIM EA ADDL 15: CPT | Mod: PN | Performed by: ACUPUNCTURIST

## 2023-07-28 PROCEDURE — 97813 ACUP 1/> W/ESTIM 1ST 15 MIN: CPT | Mod: PN | Performed by: ACUPUNCTURIST

## 2023-07-28 NOTE — PROGRESS NOTES
Acupuncture Evaluation Note     Name: Vivian Moran  Clinic Number: [id number]    Traditional Chinese Medicine (TCM) Diagnosis: Qi Stagnation, Blood Deficiency, and Damp  Medical Diagnosis: No diagnosis found.     Evaluation Date: 7/28/2023    Visit #/Visits authorized: 7     Precautions: Standard    Subjective     Chief Concern: Migraines, 3 migraines this past week    Medical necessity is demonstrated by the following IMPAIRMENTS: Medical Necessity: Decreased quality of life              Aggravating Factors:  stress   Relieving Factors:  rest    Symptom Description:     Quality:  Tight  Severity:  3  Frequency:  every day    Treatment     Treatment Principles:  Move LV + GB qi, tonify LV blood, strengthen middle burner to transform dampness    Acupuncture points used:      Bilateral points: LI11, LI4, ST25, ST36, SP9, SP6, LV3, KD3  Unilateral points: CV12, CV10, SP4, GB34, GB41, PC6, LU7, KD6  Auricular Treatment:      Needles In: 24  Needles Out: 24  Lynn W/ STIM placed: 9:45  Needles W/ STIM removed: 10:20        Assessment     After treatment, patient felt less stress, but still having migraines    Patient prognosis is Good.     Patient will continue to benefit from acupuncture treatment to address the deficits listed in the problem list box on initial evaluation, provide patient family education and to maximize pt's level of independence in the home and community environment.     Patient's spiritual, cultural and educational needs considered and pt agreeable to plan of care and goals.     Anticipated barriers to treatment: none    Plan     Recommend 1 /week for 6 sessions then re-assess.      Education:  Patient is aware of cumulative benefit of acupuncture

## 2023-08-17 ENCOUNTER — HOSPITAL ENCOUNTER (EMERGENCY)
Facility: OTHER | Age: 33
Discharge: HOME | End: 2023-08-17
Attending: EMERGENCY MEDICINE
Payer: MEDICAID

## 2023-08-17 VITALS
RESPIRATION RATE: 16 BRPM | HEIGHT: 65 IN | OXYGEN SATURATION: 99 % | WEIGHT: 140 LBS | BODY MASS INDEX: 23.32 KG/M2 | SYSTOLIC BLOOD PRESSURE: 101 MMHG | TEMPERATURE: 98 F | DIASTOLIC BLOOD PRESSURE: 63 MMHG | HEART RATE: 58 BPM

## 2023-08-17 DIAGNOSIS — G43.901 MIGRAINE WITH STATUS MIGRAINOSUS, NOT INTRACTABLE, UNSPECIFIED MIGRAINE TYPE: Primary | ICD-10-CM

## 2023-08-17 LAB
B-HCG UR QL: NEGATIVE
CTP QC/QA: YES

## 2023-08-17 PROCEDURE — 25000003 PHARM REV CODE 250

## 2023-08-17 PROCEDURE — 96374 THER/PROPH/DIAG INJ IV PUSH: CPT

## 2023-08-17 PROCEDURE — 99284 EMERGENCY DEPT VISIT MOD MDM: CPT | Mod: 25

## 2023-08-17 PROCEDURE — 96361 HYDRATE IV INFUSION ADD-ON: CPT

## 2023-08-17 PROCEDURE — 96375 TX/PRO/DX INJ NEW DRUG ADDON: CPT

## 2023-08-17 PROCEDURE — 81025 URINE PREGNANCY TEST: CPT | Performed by: NURSE PRACTITIONER

## 2023-08-17 PROCEDURE — 63600175 PHARM REV CODE 636 W HCPCS

## 2023-08-17 RX ORDER — KETOROLAC TROMETHAMINE 30 MG/ML
15 INJECTION, SOLUTION INTRAMUSCULAR; INTRAVENOUS
Status: DISCONTINUED | OUTPATIENT
Start: 2023-08-17 | End: 2023-08-17

## 2023-08-17 RX ORDER — PROCHLORPERAZINE EDISYLATE 5 MG/ML
5 INJECTION INTRAMUSCULAR; INTRAVENOUS
Status: DISCONTINUED | OUTPATIENT
Start: 2023-08-17 | End: 2023-08-17

## 2023-08-17 RX ORDER — DIPHENHYDRAMINE HYDROCHLORIDE 50 MG/ML
25 INJECTION INTRAMUSCULAR; INTRAVENOUS
Status: COMPLETED | OUTPATIENT
Start: 2023-08-17 | End: 2023-08-17

## 2023-08-17 RX ORDER — DIPHENHYDRAMINE HCL 25 MG
25 CAPSULE ORAL
Status: DISCONTINUED | OUTPATIENT
Start: 2023-08-17 | End: 2023-08-17

## 2023-08-17 RX ORDER — DIPHENHYDRAMINE HYDROCHLORIDE 50 MG/ML
25 INJECTION INTRAMUSCULAR; INTRAVENOUS
Status: DISCONTINUED | OUTPATIENT
Start: 2023-08-17 | End: 2023-08-17

## 2023-08-17 RX ORDER — PROCHLORPERAZINE EDISYLATE 5 MG/ML
10 INJECTION INTRAMUSCULAR; INTRAVENOUS
Status: COMPLETED | OUTPATIENT
Start: 2023-08-17 | End: 2023-08-17

## 2023-08-17 RX ORDER — SUMATRIPTAN 50 MG/1
50 TABLET, FILM COATED ORAL
Qty: 20 TABLET | Refills: 0 | Status: SHIPPED | OUTPATIENT
Start: 2023-08-17 | End: 2023-08-31

## 2023-08-17 RX ORDER — KETOROLAC TROMETHAMINE 30 MG/ML
15 INJECTION, SOLUTION INTRAMUSCULAR; INTRAVENOUS
Status: COMPLETED | OUTPATIENT
Start: 2023-08-17 | End: 2023-08-17

## 2023-08-17 RX ADMIN — KETOROLAC TROMETHAMINE 15 MG: 30 INJECTION, SOLUTION INTRAMUSCULAR; INTRAVENOUS at 09:08

## 2023-08-17 RX ADMIN — PROCHLORPERAZINE EDISYLATE 10 MG: 5 INJECTION, SOLUTION INTRAMUSCULAR; INTRAVENOUS at 09:08

## 2023-08-17 RX ADMIN — DIPHENHYDRAMINE HYDROCHLORIDE 25 MG: 50 INJECTION, SOLUTION INTRAMUSCULAR; INTRAVENOUS at 09:08

## 2023-08-17 RX ADMIN — SODIUM CHLORIDE 1000 ML: 9 INJECTION, SOLUTION INTRAVENOUS at 09:08

## 2023-08-18 ENCOUNTER — CLINICAL SUPPORT (OUTPATIENT)
Dept: REHABILITATION | Facility: HOSPITAL | Age: 33
End: 2023-08-18
Payer: MEDICAID

## 2023-08-18 DIAGNOSIS — G43.801 OTHER MIGRAINE WITH STATUS MIGRAINOSUS, NOT INTRACTABLE: Primary | ICD-10-CM

## 2023-08-18 PROCEDURE — 97811 ACUP 1/> W/O ESTIM EA ADD 15: CPT | Mod: PN | Performed by: ACUPUNCTURIST

## 2023-08-18 PROCEDURE — 97810 ACUP 1/> WO ESTIM 1ST 15 MIN: CPT | Mod: PN | Performed by: ACUPUNCTURIST

## 2023-08-18 NOTE — ED PROVIDER NOTES
"Encounter Date: 8/17/2023       History     Chief Complaint   Patient presents with    Headache     C/o HA 6/10 and nausea x 5 days with no OTC relief. PMH Migraines requesting new referral with neurologist. -change in vision/vomiting. Denies any other complaints. ALEC       Kamial Dashawn Moran is a 32 y.o. female with history of migraines presenting to the emergency department for evaluation of intermittent migraine headaches for 6 days. She reports the pain is located at her forehead and behind both eyes. She reports these headaches are similar to her migraine headaches. She reports associated photophobia and intermittent nausea. She has been taking ibuprofen and Nurtec, which have provided temporary relief of migraines for a few hours. She also uses subcutaneous injections of medication for migraines once a month. Patient states she has been given referrals to Neurology in the past, but she has been unable to obtain an appointment due to full patient panels or appointments too far in the future. She denies recent falls or head trauma.  No other complaints at this time.    The history is provided by the patient.     Review of patient's allergies indicates:   Allergen Reactions    Maxalt [rizatriptan] Other (See Comments)     Pt reports " neck stiffness"        Past Medical History:   Diagnosis Date    Migraine headache      Past Surgical History:   Procedure Laterality Date    DILATION AND CURETTAGE OF UTERUS      VAGINAL DELIVERY  12/19/2018     Family History   Problem Relation Age of Onset    No Known Problems Mother     No Known Problems Father     No Known Problems Maternal Aunt     No Known Problems Paternal Aunt     No Known Problems Maternal Grandmother     No Known Problems Paternal Grandmother      Social History     Tobacco Use    Smoking status: Never    Smokeless tobacco: Never   Substance Use Topics    Alcohol use: No    Drug use: No     Review of Systems   Constitutional:  Negative for chills and " fever.   HENT:  Negative for congestion, rhinorrhea and sore throat.    Eyes:  Positive for photophobia. Negative for pain, discharge, redness, itching and visual disturbance.   Respiratory:  Negative for cough and shortness of breath.    Cardiovascular:  Negative for chest pain.   Gastrointestinal:  Positive for nausea. Negative for abdominal pain, diarrhea and vomiting.   Genitourinary:  Negative for dysuria, frequency and urgency.   Musculoskeletal:  Negative for back pain.   Skin:  Negative for rash.   Neurological:  Positive for headaches. Negative for dizziness.   Psychiatric/Behavioral:  Negative for confusion.        Physical Exam     Initial Vitals [08/17/23 2010]   BP Pulse Resp Temp SpO2   109/71 66 18 98.1 °F (36.7 °C) 98 %      MAP       --         Physical Exam    Vitals reviewed.  Constitutional: She appears well-developed and well-nourished. No distress.   Wearing sun glasses.    HENT:   Head: Normocephalic and atraumatic.   Right Ear: Tympanic membrane, external ear and ear canal normal.   Left Ear: Tympanic membrane, external ear and ear canal normal.   Eyes: Conjunctivae and EOM are normal. Pupils are equal, round, and reactive to light. Right eye exhibits no discharge. Left eye exhibits no discharge.   Mild photophobia. No nystagmus.    Neck: Neck supple.   Normal range of motion.  Cardiovascular:  Normal rate, regular rhythm, normal heart sounds and intact distal pulses.           Pulmonary/Chest: Breath sounds normal. No respiratory distress. She has no wheezes. She has no rhonchi. She has no rales. She exhibits no tenderness.   Musculoskeletal:         General: Normal range of motion.      Cervical back: Normal range of motion and neck supple.     Neurological: She is alert and oriented to person, place, and time. She has normal strength. No cranial nerve deficit or sensory deficit. GCS score is 15. GCS eye subscore is 4. GCS verbal subscore is 5. GCS motor subscore is 6.   No focal  neurological deficits.   Skin: Skin is warm and dry.   Psychiatric: She has a normal mood and affect. Her behavior is normal. Judgment and thought content normal.         ED Course   Procedures  Labs Reviewed   POCT URINE PREGNANCY          Imaging Results    None          Medications   sodium chloride 0.9% bolus 1,000 mL 1,000 mL (0 mLs Intravenous Stopped 8/17/23 2202)   prochlorperazine injection Soln 10 mg (10 mg Intravenous Given 8/17/23 2102)   diphenhydrAMINE injection 25 mg (25 mg Intravenous Given 8/17/23 2102)   ketorolac injection 15 mg (15 mg Intravenous Given 8/17/23 2128)     Medical Decision Making  Risk  Prescription drug management.                          Medical Decision Making:   Initial Assessment:   Urgent evaluation of 32-year-old female with history of migraine headaches who presents with intermittent migraines for 6 days. Reports associated nausea and photophobia. Temporary relief with ibuprofen and Nurtec. She is well appearing and nontoxic. Mild photophobia on exam. No other focal neurological deficits. Will give IV fluids, compazine, benadry.  Do not feel imaging is necessary at this time given that headache is from atraumatic etiology and she has a history of migraine headaches.  She reports that today's headache feels similar to her migraines also.  ED Management:  On reassessment, patient reports her migraine has resolved after receiving IV fluids, Compazine, Benadryl, and Toradol.  Will discharge home with prescription for Imitrex.  Will provide another ambulatory referral to Neurology.  We will have case management help with setting up a sooner appointment.  Patient verbalized understanding and agreement with this plan of care. She was given specific return precautions. Advised to follow up with PCP as needed. All questions and concerns addressed. She is stable for discharge.       Clinical Impression:   Final diagnoses:  [G43.901] Migraine with status migrainosus, not intractable,  unspecified migraine type (Primary)        ED Disposition Condition    Discharge Stable          ED Prescriptions       Medication Sig Dispense Start Date End Date Auth. Provider    sumatriptan (IMITREX) 50 MG tablet Take 1 tablet (50 mg total) by mouth as needed for Migraine. 20 tablet 8/17/2023 8/31/2023 Maikel Lock PA-C          Follow-up Information    None          Maikel Lock PA-C  08/19/23 4092

## 2023-08-18 NOTE — PROGRESS NOTES
Acupuncture Evaluation Note     Name: Vivian Moran  [facility] Number: [id number]    Traditional Chinese Medicine (TCM) Diagnosis: Qi Stagnation and Blood Stasis  Medical Diagnosis:   Encounter Diagnosis   Name Primary?    Other migraine with status migrainosus, not intractable Yes        Evaluation Date: 8/18/2023    Visit #/Visits authorized: 8     Precautions: Standard    Subjective     Chief Concern: Migraines    Medical necessity is demonstrated by the following IMPAIRMENTS: Medical Necessity: Decreased quality of life and Nausea and Vomiting              Aggravating Factors:  stress   Relieving Factors:  rest    Symptom Description:     Quality:  Tight  Severity:  3  Frequency:  every day    Treatment     Treatment Principles:  Move qi, invigorate blood, stop pain    Acupuncture points used:      Bilateral points: SP4, PC6, GB34, LV3, LI4, SP6, SP9, ST36  Unilateral points:  Auricular Treatment:  lundberg men    Needles In: 16  Needles Out: 16  Needles W/O STIM placed: 11:25  Needles W/O STIM removed: 11:50        Assessment     After treatment, patient felt migraines still occurring frequently     Patient prognosis is Good.     Patient will continue to benefit from acupuncture treatment to address the deficits listed in the problem list box on initial evaluation, provide patient family education and to maximize pt's level of independence in the home and community environment.     Patient's spiritual, cultural and educational needs considered and pt agreeable to plan of care and goals.     Anticipated barriers to treatment: none    Plan     Recommend 1 /week for 5 sessions then re-assess.      Education:  Patient is aware of cumulative benefit of acupuncture

## 2023-08-18 NOTE — FIRST PROVIDER EVALUATION
" Emergency Department TeleTriage Encounter Note      CHIEF COMPLAINT    Chief Complaint   Patient presents with    Headache     C/o HA 6/10 and nausea x 5 days with no OTC relief. PMH Migraines requesting new referral with neurologist. -change in vision/vomiting. Denies any other complaints. VSS         VITAL SIGNS   Initial Vitals [08/17/23 2010]   BP Pulse Resp Temp SpO2   109/71 66 18 98.1 °F (36.7 °C) 98 %      MAP       --            ALLERGIES    Review of patient's allergies indicates:   Allergen Reactions    Maxalt [rizatriptan] Other (See Comments)     Pt reports " neck stiffness"          PROVIDER TRIAGE NOTE  Verbal consent for the teletriage evaluation was given by the patient at the start of the evaluation.  All efforts will be made to maintain patient's privacy during the evaluation.      This is a teletriage evaluation of a 32 y.o. female presenting to the ED with c/o migraine HA with nausea.  No relief from Ibuprofen and Nurtec.   States feels like typical migraine HAs.  Limited physical exam via telehealth: The patient is awake, alert, answering questions appropriately and is not in respiratory distress.  As the Teletriage provider, I performed an initial assessment and ordered appropriate labs and imaging studies, if any, to facilitate the patient's care once placed in the ED. Once a room is available, care and a full evaluation will be completed by an alternate ED provider.  Any additional orders and the final disposition will be determined by that provider.  All imaging and labs will not be followed-up by the Teletriage Team, including myself.          ORDERS  Labs Reviewed   POCT URINE PREGNANCY       ED Orders (720h ago, onward)      Start Ordered     Status Ordering Provider    08/17/23 2016 08/17/23 2015  POCT urine pregnancy  Once         Ordered MACK HOLDEN              Virtual Visit Note: The provider triage portion of this emergency department evaluation and documentation was performed " via rSmart, a HIPAA-compliant telemedicine application, in concert with a tele-presenter in the room. A face to face patient evaluation with one of my colleagues will occur once the patient is placed in an emergency department room.      DISCLAIMER: This note was prepared with [employer] voice recognition transcription software. Garbled syntax, mangled pronouns, and other bizarre constructions may be attributed to that software system.

## 2023-08-18 NOTE — ED TRIAGE NOTES
C/o headache for the past 5 days, unrelieved with OTC meds. States h/o migraines and currently trying to get established with neurologist. + nausea. No vomiting. + photophobia. No h/o injury. Presents awake, alert. No distress noted.

## 2023-08-18 NOTE — DISCHARGE INSTRUCTIONS
Please take Imitrex (50 mg) for migraine headaches. You can take them every 3 hours as needed for migraines. Do not take more than 200 mg in 24 hours. Please follow up with neurology. A new referral has been placed.

## 2023-08-23 ENCOUNTER — TELEPHONE (OUTPATIENT)
Dept: NEUROLOGY | Facility: CLINIC | Age: 33
End: 2023-08-23
Payer: MEDICAID

## 2023-08-23 NOTE — TELEPHONE ENCOUNTER
----- Message from Rachael Holt sent at 8/23/2023  7:36 AM CDT -----  Contact: self  Type: Needs Medical Advice  Who Called: Patient   Best Call Back Number: [phone]  Additional Information: Pt would like to scheduled an appt and wants to pay cash. Plz call pt wants to get in very soon. Thanks

## 2023-08-23 NOTE — TELEPHONE ENCOUNTER
----- Message from Rachael Holt sent at 8/23/2023  7:36 AM CDT -----  Contact: self  Type: Needs Medical Advice  Who Called: Patient   Best Call Back Number: [phone]  Additional Information: Pt would like to scheduled an appt and wants to pay cash. Plz call pt wants to get in very soon. Thanks

## 2023-08-24 NOTE — TELEPHONE ENCOUNTER
Spoke with Ms Vivian informed her at this time we are full of our Medicaid Panel for 2023, she can be place on the waitlist for 2024.  She offered to be scheduled by paying cash.  Informed her it would be medicaid fraud for [facility] to accept cash payments from the insured by Medicaid.  Pt apologized she did not know that information.  Referred her to the [facility] Neuro department #[phone]. Verbalized understanding.

## 2023-09-14 ENCOUNTER — CLINICAL SUPPORT (OUTPATIENT)
Dept: REHABILITATION | Facility: HOSPITAL | Age: 33
End: 2023-09-14
Payer: MEDICAID

## 2023-09-14 DIAGNOSIS — G43.801 OTHER MIGRAINE WITH STATUS MIGRAINOSUS, NOT INTRACTABLE: Primary | ICD-10-CM

## 2023-09-14 PROCEDURE — 97811 ACUP 1/> W/O ESTIM EA ADD 15: CPT | Mod: PN | Performed by: ACUPUNCTURIST

## 2023-09-14 PROCEDURE — 97810 ACUP 1/> WO ESTIM 1ST 15 MIN: CPT | Mod: PN | Performed by: ACUPUNCTURIST

## 2023-09-14 NOTE — PROGRESS NOTES
Acupuncture Evaluation Note     Name: Vivian Moran  Clinic Number: [id number]    Traditional Chinese Medicine (TCM) Diagnosis: Qi Stagnation, Blood Deficiency, and Damp  Medical Diagnosis: No diagnosis found.     Evaluation Date: 9/14/2023    Visit #/Visits authorized: 9     Precautions: Standard    Subjective     Chief Concern: Migraines, daily     Medical necessity is demonstrated by the following IMPAIRMENTS: Medical Necessity: Decreased quality of life              Aggravating Factors:  stress   Relieving Factors:  rest    Symptom Description:     Quality:  Tight  Severity:  3  Frequency:  every day    Treatment     Treatment Principles:  Harmonize qi, transform damp, tonify blood, stop pain     Acupuncture points used:      Bilateral points: LI11, LI4, LU7, LV3, KD6, SP6  Unilateral points: SP4, PC6, GB41, TW5, HT7, LV8, GB34, SP9, ST36   Auricular Treatment:  lundberg men and point zero    Needles In: 23  Needles Out: 23  Needles W/O STIM placed: 11:20  Needles W/O STIM removed: 11:50        Assessment     After treatment, patient felt relaxed     Patient prognosis is Good.     Patient will continue to benefit from acupuncture treatment to address the deficits listed in the problem list box on initial evaluation, provide patient family education and to maximize pt's level of independence in the home and community environment.     Patient's spiritual, cultural and educational needs considered and pt agreeable to plan of care and goals.     Anticipated barriers to treatment: none    Plan     Recommend 1 /week for 4 sessions then re-assess.      Education:  Patient is aware of cumulative benefit of acupuncture

## 2024-05-24 DIAGNOSIS — G43.009 MIGRAINE WITHOUT AURA: Primary | ICD-10-CM

## 2024-07-11 ENCOUNTER — TELEPHONE (OUTPATIENT)
Dept: NEUROLOGY | Facility: CLINIC | Age: 34
End: 2024-07-11
Payer: MEDICAID

## 2024-07-27 ENCOUNTER — HOSPITAL ENCOUNTER (EMERGENCY)
Facility: OTHER | Age: 34
Discharge: HOME | End: 2024-07-27
Attending: EMERGENCY MEDICINE
Payer: MEDICAID

## 2024-07-27 VITALS
WEIGHT: 137 LBS | DIASTOLIC BLOOD PRESSURE: 64 MMHG | OXYGEN SATURATION: 97 % | BODY MASS INDEX: 22.82 KG/M2 | TEMPERATURE: 98 F | SYSTOLIC BLOOD PRESSURE: 112 MMHG | RESPIRATION RATE: 17 BRPM | HEART RATE: 77 BPM | HEIGHT: 65 IN

## 2024-07-27 DIAGNOSIS — G43.801 OTHER MIGRAINE WITH STATUS MIGRAINOSUS, NOT INTRACTABLE: Primary | ICD-10-CM

## 2024-07-27 LAB
ALBUMIN SERPL BCP-MCNC: 3.9 G/DL (ref 3.5–5.2)
ALP SERPL-CCNC: 46 U/L (ref 55–135)
ALT SERPL W/O P-5'-P-CCNC: 13 U/L (ref 10–44)
ANION GAP SERPL CALC-SCNC: 9 MMOL/L (ref 8–16)
AST SERPL-CCNC: 18 U/L (ref 10–40)
B-HCG UR QL: NEGATIVE
BASOPHILS # BLD AUTO: 0.05 K/UL (ref 0–0.2)
BASOPHILS NFR BLD: 1.1 % (ref 0–1.9)
BILIRUB SERPL-MCNC: 0.7 MG/DL (ref 0.1–1)
BILIRUB UR QL STRIP: NEGATIVE
BUN SERPL-MCNC: 18 MG/DL (ref 6–20)
CALCIUM SERPL-MCNC: 9.4 MG/DL (ref 8.7–10.5)
CHLORIDE SERPL-SCNC: 107 MMOL/L (ref 95–110)
CLARITY UR: ABNORMAL
CO2 SERPL-SCNC: 22 MMOL/L (ref 23–29)
COLOR UR: YELLOW
CREAT SERPL-MCNC: 1 MG/DL (ref 0.5–1.4)
CTP QC/QA: YES
DIFFERENTIAL METHOD BLD: ABNORMAL
EOSINOPHIL # BLD AUTO: 0.2 K/UL (ref 0–0.5)
EOSINOPHIL NFR BLD: 3.2 % (ref 0–8)
ERYTHROCYTE [DISTWIDTH] IN BLOOD BY AUTOMATED COUNT: 13.2 % (ref 11.5–14.5)
EST. GFR  (NO RACE VARIABLE): >60 ML/MIN/1.73 M^2
GLUCOSE SERPL-MCNC: 102 MG/DL (ref 70–110)
GLUCOSE UR QL STRIP: NEGATIVE
HCT VFR BLD AUTO: 35.4 % (ref 37–48.5)
HGB BLD-MCNC: 11.9 G/DL (ref 12–16)
HGB UR QL STRIP: NEGATIVE
IMM GRANULOCYTES # BLD AUTO: 0.01 K/UL (ref 0–0.04)
IMM GRANULOCYTES NFR BLD AUTO: 0.2 % (ref 0–0.5)
KETONES UR QL STRIP: NEGATIVE
LEUKOCYTE ESTERASE UR QL STRIP: ABNORMAL
LYMPHOCYTES # BLD AUTO: 2.4 K/UL (ref 1–4.8)
LYMPHOCYTES NFR BLD: 50.5 % (ref 18–48)
MCH RBC QN AUTO: 28.5 PG (ref 27–31)
MCHC RBC AUTO-ENTMCNC: 33.6 G/DL (ref 32–36)
MCV RBC AUTO: 85 FL (ref 82–98)
MICROSCOPIC COMMENT: NORMAL
MONOCYTES # BLD AUTO: 0.4 K/UL (ref 0.3–1)
MONOCYTES NFR BLD: 8.2 % (ref 4–15)
NEUTROPHILS # BLD AUTO: 1.8 K/UL (ref 1.8–7.7)
NEUTROPHILS NFR BLD: 36.8 % (ref 38–73)
NITRITE UR QL STRIP: NEGATIVE
NRBC BLD-RTO: 0 /100 WBC
PH UR STRIP: 7 [PH] (ref 5–8)
PLATELET # BLD AUTO: 287 K/UL (ref 150–450)
PMV BLD AUTO: 9.9 FL (ref 9.2–12.9)
POTASSIUM SERPL-SCNC: 4 MMOL/L (ref 3.5–5.1)
PROT SERPL-MCNC: 8 G/DL (ref 6–8.4)
PROT UR QL STRIP: NEGATIVE
RBC # BLD AUTO: 4.17 M/UL (ref 4–5.4)
RBC #/AREA URNS HPF: 1 /HPF (ref 0–4)
SODIUM SERPL-SCNC: 138 MMOL/L (ref 136–145)
SP GR UR STRIP: 1.01 (ref 1–1.03)
SQUAMOUS #/AREA URNS HPF: 14 /HPF
URN SPEC COLLECT METH UR: ABNORMAL
UROBILINOGEN UR STRIP-ACNC: NEGATIVE EU/DL
WBC # BLD AUTO: 4.75 K/UL (ref 3.9–12.7)
WBC #/AREA URNS HPF: 5 /HPF (ref 0–5)

## 2024-07-27 PROCEDURE — 99284 EMERGENCY DEPT VISIT MOD MDM: CPT | Mod: 25

## 2024-07-27 PROCEDURE — 80053 COMPREHEN METABOLIC PANEL: CPT | Performed by: PHYSICIAN ASSISTANT

## 2024-07-27 PROCEDURE — 96374 THER/PROPH/DIAG INJ IV PUSH: CPT

## 2024-07-27 PROCEDURE — 85025 COMPLETE CBC W/AUTO DIFF WBC: CPT | Performed by: PHYSICIAN ASSISTANT

## 2024-07-27 PROCEDURE — 96361 HYDRATE IV INFUSION ADD-ON: CPT

## 2024-07-27 PROCEDURE — 81025 URINE PREGNANCY TEST: CPT | Performed by: PHYSICIAN ASSISTANT

## 2024-07-27 PROCEDURE — 63600175 PHARM REV CODE 636 W HCPCS: Performed by: PHYSICIAN ASSISTANT

## 2024-07-27 PROCEDURE — 81000 URINALYSIS NONAUTO W/SCOPE: CPT | Performed by: PHYSICIAN ASSISTANT

## 2024-07-27 PROCEDURE — 25000003 PHARM REV CODE 250: Performed by: PHYSICIAN ASSISTANT

## 2024-07-27 PROCEDURE — 96375 TX/PRO/DX INJ NEW DRUG ADDON: CPT

## 2024-07-27 RX ORDER — KETOROLAC TROMETHAMINE 30 MG/ML
15 INJECTION, SOLUTION INTRAMUSCULAR; INTRAVENOUS
Status: COMPLETED | OUTPATIENT
Start: 2024-07-27 | End: 2024-07-27

## 2024-07-27 RX ORDER — SODIUM CHLORIDE 9 MG/ML
1000 INJECTION, SOLUTION INTRAVENOUS
Status: COMPLETED | OUTPATIENT
Start: 2024-07-27 | End: 2024-07-27

## 2024-07-27 RX ORDER — DIPHENHYDRAMINE HYDROCHLORIDE 50 MG/ML
12.5 INJECTION INTRAMUSCULAR; INTRAVENOUS
Status: COMPLETED | OUTPATIENT
Start: 2024-07-27 | End: 2024-07-27

## 2024-07-27 RX ORDER — DEXAMETHASONE SODIUM PHOSPHATE 4 MG/ML
8 INJECTION, SOLUTION INTRA-ARTICULAR; INTRALESIONAL; INTRAMUSCULAR; INTRAVENOUS; SOFT TISSUE
Status: COMPLETED | OUTPATIENT
Start: 2024-07-27 | End: 2024-07-27

## 2024-07-27 RX ORDER — BUTALBITAL, ACETAMINOPHEN AND CAFFEINE 50; 325; 40 MG/1; MG/1; MG/1
1 TABLET ORAL
Status: DISCONTINUED | OUTPATIENT
Start: 2024-07-27 | End: 2024-07-27 | Stop reason: HOSPADM

## 2024-07-27 RX ORDER — METOCLOPRAMIDE HYDROCHLORIDE 5 MG/ML
10 INJECTION INTRAMUSCULAR; INTRAVENOUS
Status: COMPLETED | OUTPATIENT
Start: 2024-07-27 | End: 2024-07-27

## 2024-07-27 RX ADMIN — KETOROLAC TROMETHAMINE 15 MG: 30 INJECTION, SOLUTION INTRAMUSCULAR; INTRAVENOUS at 08:07

## 2024-07-27 RX ADMIN — SODIUM CHLORIDE 1000 ML: 9 INJECTION, SOLUTION INTRAVENOUS at 07:07

## 2024-07-27 RX ADMIN — METOCLOPRAMIDE 10 MG: 5 INJECTION, SOLUTION INTRAMUSCULAR; INTRAVENOUS at 08:07

## 2024-07-27 RX ADMIN — DEXAMETHASONE SODIUM PHOSPHATE 8 MG: 4 INJECTION INTRA-ARTICULAR; INTRALESIONAL; INTRAMUSCULAR; INTRAVENOUS; SOFT TISSUE at 08:07

## 2024-07-27 RX ADMIN — DIPHENHYDRAMINE HYDROCHLORIDE 12.5 MG: 50 INJECTION, SOLUTION INTRAMUSCULAR; INTRAVENOUS at 08:07

## 2024-08-11 ENCOUNTER — HOSPITAL ENCOUNTER (EMERGENCY)
Facility: OTHER | Age: 34
Discharge: HOME | End: 2024-08-11
Attending: EMERGENCY MEDICINE
Payer: MEDICAID

## 2024-08-11 VITALS
OXYGEN SATURATION: 98 % | RESPIRATION RATE: 18 BRPM | TEMPERATURE: 98 F | SYSTOLIC BLOOD PRESSURE: 103 MMHG | DIASTOLIC BLOOD PRESSURE: 65 MMHG | BODY MASS INDEX: 23.32 KG/M2 | HEIGHT: 65 IN | WEIGHT: 140 LBS | HEART RATE: 80 BPM

## 2024-08-11 DIAGNOSIS — G43.009 MIGRAINE WITHOUT AURA AND WITHOUT STATUS MIGRAINOSUS, NOT INTRACTABLE: Primary | ICD-10-CM

## 2024-08-11 LAB
ALBUMIN SERPL BCP-MCNC: 3.9 G/DL (ref 3.5–5.2)
ALP SERPL-CCNC: 40 U/L (ref 55–135)
ALT SERPL W/O P-5'-P-CCNC: 15 U/L (ref 10–44)
ANION GAP SERPL CALC-SCNC: 8 MMOL/L (ref 8–16)
AST SERPL-CCNC: 19 U/L (ref 10–40)
B-HCG UR QL: NEGATIVE
BASOPHILS # BLD AUTO: 0.03 K/UL (ref 0–0.2)
BASOPHILS NFR BLD: 0.6 % (ref 0–1.9)
BILIRUB SERPL-MCNC: 1.5 MG/DL (ref 0.1–1)
BUN SERPL-MCNC: 12 MG/DL (ref 6–20)
CALCIUM SERPL-MCNC: 9.2 MG/DL (ref 8.7–10.5)
CHLORIDE SERPL-SCNC: 103 MMOL/L (ref 95–110)
CO2 SERPL-SCNC: 27 MMOL/L (ref 23–29)
CREAT SERPL-MCNC: 0.9 MG/DL (ref 0.5–1.4)
CTP QC/QA: YES
DIFFERENTIAL METHOD BLD: ABNORMAL
EOSINOPHIL # BLD AUTO: 0.1 K/UL (ref 0–0.5)
EOSINOPHIL NFR BLD: 1.5 % (ref 0–8)
ERYTHROCYTE [DISTWIDTH] IN BLOOD BY AUTOMATED COUNT: 13.2 % (ref 11.5–14.5)
EST. GFR  (NO RACE VARIABLE): >60 ML/MIN/1.73 M^2
GLUCOSE SERPL-MCNC: 84 MG/DL (ref 70–110)
HCT VFR BLD AUTO: 33.5 % (ref 37–48.5)
HGB BLD-MCNC: 11.2 G/DL (ref 12–16)
IMM GRANULOCYTES # BLD AUTO: 0 K/UL (ref 0–0.04)
IMM GRANULOCYTES NFR BLD AUTO: 0 % (ref 0–0.5)
LYMPHOCYTES # BLD AUTO: 2.1 K/UL (ref 1–4.8)
LYMPHOCYTES NFR BLD: 39.4 % (ref 18–48)
MCH RBC QN AUTO: 28.8 PG (ref 27–31)
MCHC RBC AUTO-ENTMCNC: 33.4 G/DL (ref 32–36)
MCV RBC AUTO: 86 FL (ref 82–98)
MONOCYTES # BLD AUTO: 0.4 K/UL (ref 0.3–1)
MONOCYTES NFR BLD: 7.1 % (ref 4–15)
NEUTROPHILS # BLD AUTO: 2.7 K/UL (ref 1.8–7.7)
NEUTROPHILS NFR BLD: 51.4 % (ref 38–73)
NRBC BLD-RTO: 0 /100 WBC
PLATELET # BLD AUTO: 315 K/UL (ref 150–450)
PMV BLD AUTO: 9.4 FL (ref 9.2–12.9)
POTASSIUM SERPL-SCNC: 4 MMOL/L (ref 3.5–5.1)
PROT SERPL-MCNC: 7.7 G/DL (ref 6–8.4)
RBC # BLD AUTO: 3.89 M/UL (ref 4–5.4)
SODIUM SERPL-SCNC: 138 MMOL/L (ref 136–145)
WBC # BLD AUTO: 5.2 K/UL (ref 3.9–12.7)

## 2024-08-11 PROCEDURE — 81025 URINE PREGNANCY TEST: CPT | Performed by: PHYSICIAN ASSISTANT

## 2024-08-11 PROCEDURE — 96374 THER/PROPH/DIAG INJ IV PUSH: CPT

## 2024-08-11 PROCEDURE — 25000003 PHARM REV CODE 250

## 2024-08-11 PROCEDURE — 80053 COMPREHEN METABOLIC PANEL: CPT

## 2024-08-11 PROCEDURE — 96375 TX/PRO/DX INJ NEW DRUG ADDON: CPT

## 2024-08-11 PROCEDURE — 63600175 PHARM REV CODE 636 W HCPCS

## 2024-08-11 PROCEDURE — 96361 HYDRATE IV INFUSION ADD-ON: CPT

## 2024-08-11 PROCEDURE — 99284 EMERGENCY DEPT VISIT MOD MDM: CPT | Mod: 25

## 2024-08-11 PROCEDURE — 85025 COMPLETE CBC W/AUTO DIFF WBC: CPT

## 2024-08-11 RX ORDER — BUTALBITAL, ACETAMINOPHEN AND CAFFEINE 50; 325; 40 MG/1; MG/1; MG/1
1 TABLET ORAL EVERY 4 HOURS PRN
Qty: 12 TABLET | Refills: 0 | Status: SHIPPED | OUTPATIENT
Start: 2024-08-11 | End: 2024-09-10

## 2024-08-11 RX ORDER — DIPHENHYDRAMINE HYDROCHLORIDE 50 MG/ML
12.5 INJECTION INTRAMUSCULAR; INTRAVENOUS
Status: COMPLETED | OUTPATIENT
Start: 2024-08-11 | End: 2024-08-11

## 2024-08-11 RX ORDER — BUTALBITAL, ACETAMINOPHEN AND CAFFEINE 50; 325; 40 MG/1; MG/1; MG/1
1 TABLET ORAL
Status: DISCONTINUED | OUTPATIENT
Start: 2024-08-11 | End: 2024-08-11

## 2024-08-11 RX ORDER — KETOROLAC TROMETHAMINE 30 MG/ML
30 INJECTION, SOLUTION INTRAMUSCULAR; INTRAVENOUS
Status: COMPLETED | OUTPATIENT
Start: 2024-08-11 | End: 2024-08-11

## 2024-08-11 RX ORDER — METOCLOPRAMIDE HYDROCHLORIDE 5 MG/ML
10 INJECTION INTRAMUSCULAR; INTRAVENOUS
Status: COMPLETED | OUTPATIENT
Start: 2024-08-11 | End: 2024-08-11

## 2024-08-11 RX ADMIN — DIPHENHYDRAMINE HYDROCHLORIDE 12.5 MG: 50 INJECTION, SOLUTION INTRAMUSCULAR; INTRAVENOUS at 03:08

## 2024-08-11 RX ADMIN — KETOROLAC TROMETHAMINE 30 MG: 30 INJECTION, SOLUTION INTRAMUSCULAR; INTRAVENOUS at 03:08

## 2024-08-11 RX ADMIN — SODIUM CHLORIDE 1000 ML: 0.9 INJECTION, SOLUTION INTRAVENOUS at 03:08

## 2024-08-11 RX ADMIN — METOCLOPRAMIDE 10 MG: 5 INJECTION, SOLUTION INTRAMUSCULAR; INTRAVENOUS at 03:08

## 2024-08-11 NOTE — ED PROVIDER NOTES
"HISTORY OF PRESENT ILLNESS: Vivian Moran is a 33 y.o. female  who presents to the emergency department today with a migraine since this morning. It is throbbing. It does not radiate. Took elavtil and imitrex without relief. Patient recently switched neurologist and is in the process of getting back to a dose of Aimovig which works for her. States her current dose is too low which is causing more frequent migraines. No neck stiffness. No rash. This is not the worse headache of their life nor was it sudden onset. No aura.  Has a history of headaches.      Headache history:    New-onset   no  Neurological findings   no  Sudden onset or worst and onset   no  Fever immunocompromise no  Elderly   no  Chronic and progressive   no  Jaw claudication, muscle aches, temporal artery tenderness   no  Symptoms present in others at home   no  Chronic headache medication withdrawal   no  Pregnancy or recent postpartum   no  Clotting disorder  no  Trauma   no  Ocular pain   no  Recent cervical manipulation with facial pain   no  Dizziness   no  Neck pain   no  Neck stiffness   no  Rash   no    ROS  Constitutional: No fever, no chills.  Eyes: No discharge. No pain.  HENT: No nasal drainage. No ear ache. No sore throat.  Cardiovascular: No chest pain, no palpitations.  Respiratory: No cough, no shortness of breath.  Gastrointestinal: No abdominal pain, no vomiting. No diarrhea.  Genitourinary: No hematuria, dysuria, urgency.  Musculoskeletal: No back pain.   Skin: No rashes, no lesions.  Neurological: No  focal weakness.      Otherwise remaining ROS negative     The history is provided by the patient      ALLERGIES REVIEWED  MEDICATIONS REVIEWED  PMH/PSH/SOC/FH REVIEWED     Nursing/Ancillary staff note reviewed.  VS reviewed      Physical Exam  BP (!) 93/54 (BP Location: Left arm)   Pulse 83   Temp 98.3 °F (36.8 °C) (Oral)   Resp 19   Ht 5' 5" (1.651 m)   Wt 63.5 kg (140 lb)   LMP 07/01/2024 (Approximate)   SpO2 96%   " Breastfeeding No   BMI 23.30 kg/m²   Nurse's notes reviewed  General Appearance:  This is a well-developed individual who is in no acute distress.  Lying in bed but able to sit up on their own without any difficulty.  HEENT:  Head:  Normocephalic, atraumatic.      Eyes: Pupils equal and round no pallor or injection. Extra ocular movements intact. No nystagmus.      Mouth: Mucous membranes are moist. Oropharynx clear.      Ears:  Tympanic membranes normal bilaterally.  Neck: Neck is supple. No lymphadenopathy.  No stridor.   Respiratory: No respiratory distress. No tachypnea.  Cardiovascular: Regular rate and rhythm.   Gastrointestinal:  Abdomen is soft and non-tender, no masses, no pulsatile masses, bowel sounds normal.  Neurological: Alert and oriented x 4. CN II-XII grossly intact. No focal weakness. Strength intact 5/5 bilaterally in upper and lower extremities.   Skin: Warm and dry, no rashes.  Musculoskeletal:  No obvious deformities.  Extremities are non-tender, non-swollen and have full range of motion.  Back nontender to palpation down the midline.      Medical Decision Making:     Initial management:  Urgent evaluation of a 33 y.o. female who presents to the ED today for headache. The pt has no red flags on history or exam. Patient has no neck stiffness/meningismus, fever, elevated blood pressure, confusion, vision loss, temporal artery tenderness, rash, or thunderclap onset to suggest pseudotumor cerebri, meningitis, tumor, ICH, temporal arteritis, or encephalitis. Has a history of headaches with similar symptoms. Plan to treat pain and reassess. Will consider imaging if pain is intractable.     DIFFERENTIAL DIAGNOSIS: After history and physical exam a differential diagnosis was considered, but was not limited to, subarachnoid hemorrhage, migraine headache, tension headache, vascular malformation, aneurysm, temporal arteritis, mass lesion, and infectious causes such as meningitis, pharyngitis and  sinusitis.    No orders to display       Labs Reviewed   CBC W/ AUTO DIFFERENTIAL - Abnormal       Result Value    WBC 5.20      RBC 3.89 (*)     Hemoglobin 11.2 (*)     Hematocrit 33.5 (*)     MCV 86      MCH 28.8      MCHC 33.4      RDW 13.2      Platelets 315      MPV 9.4      Immature Granulocytes 0.0      Gran # (ANC) 2.7      Immature Grans (Abs) 0.00      Lymph # 2.1      Mono # 0.4      Eos # 0.1      Baso # 0.03      nRBC 0      Gran % 51.4      Lymph % 39.4      Mono % 7.1      Eosinophil % 1.5      Basophil % 0.6      Differential Method Automated     COMPREHENSIVE METABOLIC PANEL - Abnormal    Sodium 138      Potassium 4.0      Chloride 103      CO2 27      Glucose 84      BUN 12      Creatinine 0.9      Calcium 9.2      Total Protein 7.7      Albumin 3.9      Total Bilirubin 1.5 (*)     Alkaline Phosphatase 40 (*)     AST 19      ALT 15      eGFR >60      Anion Gap 8     POCT URINE PREGNANCY    POC Preg Test, Ur Negative       Acceptable Yes              Medications   ketorolac injection 30 mg (30 mg Intravenous Given 8/11/24 1523)   metoclopramide injection 10 mg (10 mg Intravenous Given 8/11/24 1522)   diphenhydrAMINE injection 12.5 mg (12.5 mg Intravenous Given 8/11/24 1523)   sodium chloride 0.9% bolus 1,000 mL 1,000 mL (1,000 mLs Intravenous New Bag 8/11/24 1526)       ED Management:  Patient remains afebrile nontoxic-appearing.  There continues to be no focal neurological deficits.  Upon re-evaluation, patient reports significant improvements. The patient is feeling improved and is comfortable going home at this time.  Will discharge home with symptom control and have him follow up with their primary care physician for further evaluation is necessary. The patient understands and is comfortable going home at this time.  Patient given return precautions and educated on worrisome symptoms for which they should return to the ER, including fever, neck pain, worsening headache, weakness,  vision changes, nonstop vomiting. They reported understanding and all questions were answered.      IMPRESSION:    The encounter diagnosis was Migraine without aura and without status migrainosus, not intractable.      New Prescriptions    BUTALBITAL-ACETAMINOPHEN-CAFFEINE -40 MG (FIORICET, ESGIC) -40 MG PER TABLET    Take 1 tablet by mouth every 4 (four) hours as needed for Pain.                  Meseret Shahid, PA-C  08/11/24 3156

## 2024-08-11 NOTE — FIRST PROVIDER EVALUATION
" Emergency Department TeleTriage Encounter Note      CHIEF COMPLAINT    Chief Complaint   Patient presents with    Headache     Hx of migraine, no relief with home meds.        VITAL SIGNS   Initial Vitals [08/11/24 1412]   BP Pulse Resp Temp SpO2   (!) 93/54 83 19 98.3 °F (36.8 °C) 96 %      MAP       --            ALLERGIES    Review of patient's allergies indicates:   Allergen Reactions    Maxalt [rizatriptan] Other (See Comments)     Pt reports " neck stiffness"          PROVIDER TRIAGE NOTE  Patient presents with migraine in the exact pattern of her usual migraine. She saw a new neurologist 4 days ago and medications will be changed.       ORDERS  Labs Reviewed - No data to display    ED Orders (720h ago, onward)      None              Virtual Visit Note: The provider triage portion of this emergency department evaluation and documentation was performed via [employer], a HIPAA-compliant telemedicine application, in concert with a tele-presenter in the room. A face to face patient evaluation with one of my colleagues will occur once the patient is placed in an emergency department room.      DISCLAIMER: This note was prepared with [employer] voice recognition transcription software. Garbled syntax, mangled pronouns, and other bizarre constructions may be attributed to that software system.    "

## 2024-08-11 NOTE — ED TRIAGE NOTES
"33 yr F presents to the ER w c/o migraine starting yesterday. Pt states she switched neurologists and they decreased her dosage of her "migraine shot". Pt states " The only thing that works for me during my breakthroughs is firocet and the IV stuff yall give me". AAAOx4  "

## 2024-08-18 ENCOUNTER — HOSPITAL ENCOUNTER (EMERGENCY)
Facility: OTHER | Age: 34
Discharge: HOME | End: 2024-08-18
Attending: EMERGENCY MEDICINE
Payer: MEDICAID

## 2024-08-18 VITALS
WEIGHT: 140 LBS | SYSTOLIC BLOOD PRESSURE: 110 MMHG | OXYGEN SATURATION: 99 % | DIASTOLIC BLOOD PRESSURE: 64 MMHG | HEART RATE: 78 BPM | RESPIRATION RATE: 14 BRPM | BODY MASS INDEX: 23.3 KG/M2 | TEMPERATURE: 98 F

## 2024-08-18 DIAGNOSIS — G43.909 MIGRAINE WITHOUT STATUS MIGRAINOSUS, NOT INTRACTABLE, UNSPECIFIED MIGRAINE TYPE: Primary | ICD-10-CM

## 2024-08-18 LAB
ANION GAP SERPL CALC-SCNC: 7 MMOL/L (ref 8–16)
BASOPHILS # BLD AUTO: 0.04 K/UL (ref 0–0.2)
BASOPHILS NFR BLD: 0.6 % (ref 0–1.9)
BUN SERPL-MCNC: 17 MG/DL (ref 6–20)
CALCIUM SERPL-MCNC: 9.7 MG/DL (ref 8.7–10.5)
CHLORIDE SERPL-SCNC: 106 MMOL/L (ref 95–110)
CO2 SERPL-SCNC: 25 MMOL/L (ref 23–29)
CREAT SERPL-MCNC: 1.1 MG/DL (ref 0.5–1.4)
DIFFERENTIAL METHOD BLD: NORMAL
EOSINOPHIL # BLD AUTO: 0.1 K/UL (ref 0–0.5)
EOSINOPHIL NFR BLD: 1.8 % (ref 0–8)
ERYTHROCYTE [DISTWIDTH] IN BLOOD BY AUTOMATED COUNT: 13.2 % (ref 11.5–14.5)
EST. GFR  (NO RACE VARIABLE): >60 ML/MIN/1.73 M^2
GLUCOSE SERPL-MCNC: 84 MG/DL (ref 70–110)
HCT VFR BLD AUTO: 37.1 % (ref 37–48.5)
HGB BLD-MCNC: 12.4 G/DL (ref 12–16)
IMM GRANULOCYTES # BLD AUTO: 0.01 K/UL (ref 0–0.04)
IMM GRANULOCYTES NFR BLD AUTO: 0.2 % (ref 0–0.5)
LYMPHOCYTES # BLD AUTO: 2.7 K/UL (ref 1–4.8)
LYMPHOCYTES NFR BLD: 39.9 % (ref 18–48)
MCH RBC QN AUTO: 28.8 PG (ref 27–31)
MCHC RBC AUTO-ENTMCNC: 33.4 G/DL (ref 32–36)
MCV RBC AUTO: 86 FL (ref 82–98)
MONOCYTES # BLD AUTO: 0.4 K/UL (ref 0.3–1)
MONOCYTES NFR BLD: 6.6 % (ref 4–15)
NEUTROPHILS # BLD AUTO: 3.4 K/UL (ref 1.8–7.7)
NEUTROPHILS NFR BLD: 50.9 % (ref 38–73)
NRBC BLD-RTO: 0 /100 WBC
PLATELET # BLD AUTO: 324 K/UL (ref 150–450)
PMV BLD AUTO: 9.3 FL (ref 9.2–12.9)
POTASSIUM SERPL-SCNC: 3.9 MMOL/L (ref 3.5–5.1)
RBC # BLD AUTO: 4.31 M/UL (ref 4–5.4)
SODIUM SERPL-SCNC: 138 MMOL/L (ref 136–145)
WBC # BLD AUTO: 6.64 K/UL (ref 3.9–12.7)

## 2024-08-18 PROCEDURE — 99284 EMERGENCY DEPT VISIT MOD MDM: CPT | Mod: 25

## 2024-08-18 PROCEDURE — 96375 TX/PRO/DX INJ NEW DRUG ADDON: CPT

## 2024-08-18 PROCEDURE — 63600175 PHARM REV CODE 636 W HCPCS: Performed by: EMERGENCY MEDICINE

## 2024-08-18 PROCEDURE — 25000003 PHARM REV CODE 250: Performed by: EMERGENCY MEDICINE

## 2024-08-18 PROCEDURE — 85025 COMPLETE CBC W/AUTO DIFF WBC: CPT | Performed by: EMERGENCY MEDICINE

## 2024-08-18 PROCEDURE — 96361 HYDRATE IV INFUSION ADD-ON: CPT

## 2024-08-18 PROCEDURE — 96374 THER/PROPH/DIAG INJ IV PUSH: CPT

## 2024-08-18 PROCEDURE — 80048 BASIC METABOLIC PNL TOTAL CA: CPT | Performed by: EMERGENCY MEDICINE

## 2024-08-18 RX ORDER — DIPHENHYDRAMINE HYDROCHLORIDE 50 MG/ML
25 INJECTION INTRAMUSCULAR; INTRAVENOUS
Status: COMPLETED | OUTPATIENT
Start: 2024-08-18 | End: 2024-08-18

## 2024-08-18 RX ORDER — PROCHLORPERAZINE EDISYLATE 5 MG/ML
5 INJECTION INTRAMUSCULAR; INTRAVENOUS
Status: COMPLETED | OUTPATIENT
Start: 2024-08-18 | End: 2024-08-18

## 2024-08-18 RX ORDER — KETOROLAC TROMETHAMINE 30 MG/ML
10 INJECTION, SOLUTION INTRAMUSCULAR; INTRAVENOUS
Status: COMPLETED | OUTPATIENT
Start: 2024-08-18 | End: 2024-08-18

## 2024-08-18 RX ORDER — DEXAMETHASONE SODIUM PHOSPHATE 4 MG/ML
4 INJECTION, SOLUTION INTRA-ARTICULAR; INTRALESIONAL; INTRAMUSCULAR; INTRAVENOUS; SOFT TISSUE
Status: COMPLETED | OUTPATIENT
Start: 2024-08-18 | End: 2024-08-18

## 2024-08-18 RX ADMIN — DEXAMETHASONE SODIUM PHOSPHATE 4 MG: 4 INJECTION INTRA-ARTICULAR; INTRALESIONAL; INTRAMUSCULAR; INTRAVENOUS; SOFT TISSUE at 09:08

## 2024-08-18 RX ADMIN — SODIUM CHLORIDE 1000 ML: 9 INJECTION, SOLUTION INTRAVENOUS at 06:08

## 2024-08-18 RX ADMIN — KETOROLAC TROMETHAMINE 10 MG: 30 INJECTION, SOLUTION INTRAMUSCULAR; INTRAVENOUS at 06:08

## 2024-08-18 RX ADMIN — PROCHLORPERAZINE EDISYLATE 5 MG: 5 INJECTION INTRAMUSCULAR; INTRAVENOUS at 06:08

## 2024-08-18 RX ADMIN — DIPHENHYDRAMINE HYDROCHLORIDE 25 MG: 50 INJECTION, SOLUTION INTRAMUSCULAR; INTRAVENOUS at 06:08

## 2024-08-18 NOTE — FIRST PROVIDER EVALUATION
" Emergency Department TeleTriage Encounter Note      CHIEF COMPLAINT    Chief Complaint   Patient presents with    Headache     Reports chronic migraine for months. Pt endorses f/u with neuro and multiple visit here. Denies blurred vision.        VITAL SIGNS   Initial Vitals [08/18/24 1708]   BP Pulse Resp Temp SpO2   111/64 75 14 97.9 °F (36.6 °C) 99 %      MAP       --            ALLERGIES    Review of patient's allergies indicates:   Allergen Reactions    Maxalt [rizatriptan] Other (See Comments)     Pt reports " neck stiffness"          PROVIDER TRIAGE NOTE  Verbal consent for the teletriage evaluation was given by the patient at the start of the evaluation.  All efforts will be made to maintain patient's privacy during the evaluation.      This is a teletriage evaluation of a 33 y.o. female presenting to the ED with c/o chronic migraine headache; no relief from Fioricet and Nurtec. Limited physical exam via telehealth: The patient is awake, alert, answering questions appropriately and is not in respiratory distress.  As the Teletriage provider, I performed an initial assessment and ordered appropriate labs and imaging studies, if any, to facilitate the patient's care once placed in the ED. Once a room is available, care and a full evaluation will be completed by an alternate ED provider.  Any additional orders and the final disposition will be determined by that provider.  All imaging and labs will not be followed-up by the Teletriage Team, including myself.        ORDERS  Labs Reviewed - No data to display    ED Orders (720h ago, onward)      None              Virtual Visit Note: The provider triage portion of this emergency department evaluation and documentation was performed via [employer], a HIPAA-compliant telemedicine application, in concert with a tele-presenter in the room. A face to face patient evaluation with one of my colleagues will occur once the patient is placed in an emergency department " room.      DISCLAIMER: This note was prepared with [employer] voice recognition transcription software. Garbled syntax, mangled pronouns, and other bizarre constructions may be attributed to that software system.

## 2024-08-18 NOTE — ED PROVIDER NOTES
"Encounter Date: 8/18/2024       History     Chief Complaint   Patient presents with    Headache     Reports chronic migraine for months. Pt endorses f/u with neuro and multiple visit here. Denies blurred vision.      33-year-old female with history of migraines presents for evaluation of worsening migraine today.  She has long history of migraines, since recent medication change for her prophylactic meds were changed about a month ago, she has had daily headaches, little improvement with taking her Nurtec or Fioricet.  Last night she was at a work event and had a few alcoholic beverages which she usually avoids, and woke up with a more severe migraine than usual.  She took Nurtec and Fioricet earlier today with little improvement so came to the ED before it got any worse.  She denies any associated nausea but does endorse photophobia and phonophobia.  She describes headache as bilateral throbbing behind her eyes, similar to previous migraines.  No recent illness or other changes from baseline, no fevers or CP/SOB.       Review of patient's allergies indicates:   Allergen Reactions    Maxalt [rizatriptan] Other (See Comments)     Pt reports " neck stiffness"        Past Medical History:   Diagnosis Date    Migraine headache      Past Surgical History:   Procedure Laterality Date    DILATION AND CURETTAGE OF UTERUS      VAGINAL DELIVERY  12/19/2018     Family History   Problem Relation Name Age of Onset    No Known Problems Mother      No Known Problems Father      No Known Problems Maternal Aunt      No Known Problems Paternal Aunt      No Known Problems Maternal Grandmother      No Known Problems Paternal Grandmother       Social History     Tobacco Use    Smoking status: Never    Smokeless tobacco: Never   Substance Use Topics    Alcohol use: Not Currently    Drug use: No     Review of Systems   Constitutional:  Negative for fever.   HENT:  Negative for congestion.    Eyes:  Positive for photophobia. Negative for " redness.   Respiratory:  Negative for shortness of breath.    Cardiovascular:  Negative for chest pain.   Gastrointestinal:  Negative for abdominal pain.   Genitourinary:  Negative for dysuria.   Skin:  Negative for rash.   Neurological:  Positive for headaches.   Psychiatric/Behavioral:  Negative for confusion.        Physical Exam     Initial Vitals [08/18/24 1708]   BP Pulse Resp Temp SpO2   111/64 75 14 97.9 °F (36.6 °C) 99 %      MAP       --         Physical Exam    Constitutional: She appears well-developed and well-nourished. She is not diaphoretic. No distress.   HENT:   Head: Normocephalic and atraumatic.   Eyes: Conjunctivae are normal.   Photophobia   Neck: Neck supple.   No meningeal signs   Cardiovascular:  Normal rate, regular rhythm, S1 normal, S2 normal, normal heart sounds and intact distal pulses.           No murmur heard.  Pulmonary/Chest: Breath sounds normal. No respiratory distress. She has no wheezes. She has no rhonchi. She has no rales.   Abdominal: Abdomen is soft. There is no abdominal tenderness.   Musculoskeletal:         General: No edema.      Cervical back: Neck supple.     Neurological: She is alert and oriented to person, place, and time. She has normal strength. No cranial nerve deficit.   Skin: Skin is warm and dry.   Psychiatric: She has a normal mood and affect.         ED Course   Procedures  Labs Reviewed   BASIC METABOLIC PANEL - Abnormal       Result Value    Sodium 138      Potassium 3.9      Chloride 106      CO2 25      Glucose 84      BUN 17      Creatinine 1.1      Calcium 9.7      Anion Gap 7 (*)     eGFR >60     CBC W/ AUTO DIFFERENTIAL    WBC 6.64      RBC 4.31      Hemoglobin 12.4      Hematocrit 37.1      MCV 86      MCH 28.8      MCHC 33.4      RDW 13.2      Platelets 324      MPV 9.3      Immature Granulocytes 0.2      Gran # (ANC) 3.4      Immature Grans (Abs) 0.01      Lymph # 2.7      Mono # 0.4      Eos # 0.1      Baso # 0.04      nRBC 0      Gran % 50.9       Lymph % 39.9      Mono % 6.6      Eosinophil % 1.8      Basophil % 0.6      Differential Method Automated            Imaging Results    None          Medications   sodium chloride 0.9% bolus 1,000 mL 1,000 mL (0 mLs Intravenous Stopped 8/18/24 1947)   ketorolac injection 9.999 mg (9.999 mg Intravenous Given 8/18/24 1844)   prochlorperazine injection Soln 5 mg (5 mg Intravenous Given 8/18/24 1843)   diphenhydrAMINE injection 25 mg (25 mg Intravenous Given 8/18/24 1845)   dexAMETHasone injection 4 mg (4 mg Intravenous Given 8/18/24 2133)     Medical Decision Making      33-year-old female with history of migraines presents for evaluation of worsening migraine today.  She has long history of migraines, sees Neurology for it, recently was changed to Aimovig prophylaxis monthly treatment but at a lower dose than previously, so has had breakthrough headaches daily most days this month.  Last night she had a few alcoholic beverages which is unusual for her, and today woke up with a more severe than usual migraine headache, not relieved by her home rescue meds Nurtec and Fioricet.  She endorses photophobia and phonophobia but no nausea, no recent illness or fevers or other associated symptoms.\  On exam patient with photophobia but normal vitals, resting comfortably with normal neuro exam and no meningeal signs.  Differential diagnosis includes breakthrough migraine headache, medication overuse headache, tension headache, dehydration.  No sign of CVA or intracranial hemorrhage, no indication for emergent head CT.      After migraine cocktail, patient feels headache much improved, resting comfortably and does not require any further medications.  Will give 1 dose of Decadron to help prevent any recurrence of her headache, which he was agreeable with.  Basic labs without any acute findings.  Patient will continue to follow up with her neurologist for further management of her migraines, understands return to the ED for any  intractable migraine or other concerns.      Amount and/or Complexity of Data Reviewed  Labs: ordered.    Risk  Prescription drug management.                                      Clinical Impression:  Final diagnoses:  [G43.762] Migraine without status migrainosus, not intractable, unspecified migraine type (Primary)          ED Disposition Condition    Discharge Stable          ED Prescriptions    None       Follow-up Information       Follow up With Specialties Details Why Contact Info    Chuck Rodriguez MD Neurology Schedule an appointment as soon as possible for a visit in 3 days  [address] [address]  [phone]               Wale Earl MD  08/18/24 0973

## 2024-08-19 NOTE — ED NOTES
Pt states headache is improved, rating 3/10. Pt sleepy from benadryl. VSS, NAD. Pt resting comfortably.

## 2024-08-25 ENCOUNTER — HOSPITAL ENCOUNTER (EMERGENCY)
Facility: OTHER | Age: 34
Discharge: HOME | End: 2024-08-25
Attending: STUDENT IN AN ORGANIZED HEALTH CARE EDUCATION/TRAINING PROGRAM
Payer: MEDICAID

## 2024-08-25 VITALS
HEART RATE: 69 BPM | BODY MASS INDEX: 23.32 KG/M2 | SYSTOLIC BLOOD PRESSURE: 127 MMHG | TEMPERATURE: 98 F | OXYGEN SATURATION: 100 % | DIASTOLIC BLOOD PRESSURE: 71 MMHG | RESPIRATION RATE: 16 BRPM | WEIGHT: 140 LBS | HEIGHT: 65 IN

## 2024-08-25 DIAGNOSIS — R55 SYNCOPE: ICD-10-CM

## 2024-08-25 DIAGNOSIS — R51.9 NONINTRACTABLE HEADACHE, UNSPECIFIED CHRONICITY PATTERN, UNSPECIFIED HEADACHE TYPE: Primary | ICD-10-CM

## 2024-08-25 LAB
ALBUMIN SERPL BCP-MCNC: 4.5 G/DL (ref 3.5–5.2)
ALP SERPL-CCNC: 48 U/L (ref 55–135)
ALT SERPL W/O P-5'-P-CCNC: 24 U/L (ref 10–44)
ANION GAP SERPL CALC-SCNC: 11 MMOL/L (ref 8–16)
AST SERPL-CCNC: 28 U/L (ref 10–40)
BASOPHILS # BLD AUTO: 0.05 K/UL (ref 0–0.2)
BASOPHILS NFR BLD: 0.7 % (ref 0–1.9)
BILIRUB SERPL-MCNC: 0.5 MG/DL (ref 0.1–1)
BUN SERPL-MCNC: 14 MG/DL (ref 6–20)
CALCIUM SERPL-MCNC: 9.2 MG/DL (ref 8.7–10.5)
CHLORIDE SERPL-SCNC: 105 MMOL/L (ref 95–110)
CO2 SERPL-SCNC: 20 MMOL/L (ref 23–29)
CREAT SERPL-MCNC: 0.9 MG/DL (ref 0.5–1.4)
DIFFERENTIAL METHOD BLD: ABNORMAL
EOSINOPHIL # BLD AUTO: 0 K/UL (ref 0–0.5)
EOSINOPHIL NFR BLD: 0.4 % (ref 0–8)
ERYTHROCYTE [DISTWIDTH] IN BLOOD BY AUTOMATED COUNT: 13.1 % (ref 11.5–14.5)
EST. GFR  (NO RACE VARIABLE): >60 ML/MIN/1.73 M^2
GLUCOSE SERPL-MCNC: 93 MG/DL (ref 70–110)
HCT VFR BLD AUTO: 37.1 % (ref 37–48.5)
HGB BLD-MCNC: 12.3 G/DL (ref 12–16)
IMM GRANULOCYTES # BLD AUTO: 0.02 K/UL (ref 0–0.04)
IMM GRANULOCYTES NFR BLD AUTO: 0.3 % (ref 0–0.5)
LYMPHOCYTES # BLD AUTO: 1.7 K/UL (ref 1–4.8)
LYMPHOCYTES NFR BLD: 23.1 % (ref 18–48)
MCH RBC QN AUTO: 28.5 PG (ref 27–31)
MCHC RBC AUTO-ENTMCNC: 33.2 G/DL (ref 32–36)
MCV RBC AUTO: 86 FL (ref 82–98)
MONOCYTES # BLD AUTO: 0.2 K/UL (ref 0.3–1)
MONOCYTES NFR BLD: 3.3 % (ref 4–15)
NEUTROPHILS # BLD AUTO: 5.3 K/UL (ref 1.8–7.7)
NEUTROPHILS NFR BLD: 72.2 % (ref 38–73)
NRBC BLD-RTO: 0 /100 WBC
PLATELET # BLD AUTO: 321 K/UL (ref 150–450)
PMV BLD AUTO: 9.4 FL (ref 9.2–12.9)
POTASSIUM SERPL-SCNC: 5.2 MMOL/L (ref 3.5–5.1)
PROT SERPL-MCNC: 9.2 G/DL (ref 6–8.4)
RBC # BLD AUTO: 4.31 M/UL (ref 4–5.4)
SODIUM SERPL-SCNC: 136 MMOL/L (ref 136–145)
WBC # BLD AUTO: 7.32 K/UL (ref 3.9–12.7)

## 2024-08-25 PROCEDURE — 85025 COMPLETE CBC W/AUTO DIFF WBC: CPT | Performed by: STUDENT IN AN ORGANIZED HEALTH CARE EDUCATION/TRAINING PROGRAM

## 2024-08-25 PROCEDURE — 93010 ELECTROCARDIOGRAM REPORT: CPT | Mod: ,,, | Performed by: INTERNAL MEDICINE

## 2024-08-25 PROCEDURE — 96375 TX/PRO/DX INJ NEW DRUG ADDON: CPT

## 2024-08-25 PROCEDURE — 99284 EMERGENCY DEPT VISIT MOD MDM: CPT | Mod: 25

## 2024-08-25 PROCEDURE — 63600175 PHARM REV CODE 636 W HCPCS: Performed by: STUDENT IN AN ORGANIZED HEALTH CARE EDUCATION/TRAINING PROGRAM

## 2024-08-25 PROCEDURE — 93005 ELECTROCARDIOGRAM TRACING: CPT

## 2024-08-25 PROCEDURE — 80053 COMPREHEN METABOLIC PANEL: CPT | Performed by: STUDENT IN AN ORGANIZED HEALTH CARE EDUCATION/TRAINING PROGRAM

## 2024-08-25 PROCEDURE — 96361 HYDRATE IV INFUSION ADD-ON: CPT

## 2024-08-25 PROCEDURE — 25000003 PHARM REV CODE 250: Performed by: STUDENT IN AN ORGANIZED HEALTH CARE EDUCATION/TRAINING PROGRAM

## 2024-08-25 PROCEDURE — 96374 THER/PROPH/DIAG INJ IV PUSH: CPT

## 2024-08-25 RX ORDER — KETOROLAC TROMETHAMINE 30 MG/ML
15 INJECTION, SOLUTION INTRAMUSCULAR; INTRAVENOUS
Status: COMPLETED | OUTPATIENT
Start: 2024-08-25 | End: 2024-08-25

## 2024-08-25 RX ORDER — DEXAMETHASONE SODIUM PHOSPHATE 4 MG/ML
8 INJECTION, SOLUTION INTRA-ARTICULAR; INTRALESIONAL; INTRAMUSCULAR; INTRAVENOUS; SOFT TISSUE ONCE
Status: COMPLETED | OUTPATIENT
Start: 2024-08-25 | End: 2024-08-25

## 2024-08-25 RX ORDER — PROCHLORPERAZINE EDISYLATE 5 MG/ML
10 INJECTION INTRAMUSCULAR; INTRAVENOUS ONCE
Status: COMPLETED | OUTPATIENT
Start: 2024-08-25 | End: 2024-08-25

## 2024-08-25 RX ORDER — BUTALBITAL, ACETAMINOPHEN AND CAFFEINE 50; 325; 40 MG/1; MG/1; MG/1
1 TABLET ORAL EVERY 4 HOURS PRN
Qty: 12 TABLET | Refills: 0 | Status: SHIPPED | OUTPATIENT
Start: 2024-08-25 | End: 2024-09-24

## 2024-08-25 RX ORDER — DIPHENHYDRAMINE HYDROCHLORIDE 50 MG/ML
25 INJECTION INTRAMUSCULAR; INTRAVENOUS
Status: COMPLETED | OUTPATIENT
Start: 2024-08-25 | End: 2024-08-25

## 2024-08-25 RX ADMIN — SODIUM CHLORIDE 1000 ML: 9 INJECTION, SOLUTION INTRAVENOUS at 05:08

## 2024-08-25 RX ADMIN — KETOROLAC TROMETHAMINE 15 MG: 30 INJECTION, SOLUTION INTRAMUSCULAR; INTRAVENOUS at 06:08

## 2024-08-25 RX ADMIN — DIPHENHYDRAMINE HYDROCHLORIDE 25 MG: 50 INJECTION, SOLUTION INTRAMUSCULAR; INTRAVENOUS at 06:08

## 2024-08-25 RX ADMIN — DEXAMETHASONE SODIUM PHOSPHATE 8 MG: 4 INJECTION INTRA-ARTICULAR; INTRALESIONAL; INTRAMUSCULAR; INTRAVENOUS; SOFT TISSUE at 06:08

## 2024-08-25 RX ADMIN — PROCHLORPERAZINE EDISYLATE 10 MG: 5 INJECTION INTRAMUSCULAR; INTRAVENOUS at 06:08

## 2024-08-25 NOTE — Clinical Note
"Kamial "Didier Moran was seen and treated in our emergency department on 8/25/2024.  She may return to work on 08/26/2024.       If you have any questions or concerns, please don't hesitate to call.      Angelito Chavez MD"

## 2024-08-25 NOTE — ED NOTES
Pt resting comfortably in bed states pain is not as bad as it was when she arrived but still lingering. She says she just wants to go back to bed.

## 2024-08-25 NOTE — ED PROVIDER NOTES
ED Provider Note - 8/25/2024    History     Chief Complaint   Patient presents with    Headache     Patient brought to ER for patient stated passed out twice , patient states she went out about five hours ago having some alcoholic beverages and smoked hookah , patient advises she think she over did it, passing out but remembers everything , dizzy , weak, and migraine at this time , pain on scale 10, patient has history of migraine and is medicated at this time       HPI     Vivian Moran is a 33 y.o. year old female with past medical and surgical history as seen below, presenting with chief complaint of headache.  Has history of chronic migraines.  Being followed by Neurology in the [facility] system.  Had a celebration for her being in the news for various accomplishments.  During the celebration she had at least 3 glasses of champagne and 1 cocktail.  Also was utilizing a hookah.  States no one else was on the hookah with her so she continued using it in did too much.  Then became lightheaded and passed out.  Headache has been worse since that time as well.        Past Medical History:   Diagnosis Date    Migraine headache      Past Surgical History:   Procedure Laterality Date    DILATION AND CURETTAGE OF UTERUS      VAGINAL DELIVERY  12/19/2018         Family History   Problem Relation Name Age of Onset    No Known Problems Mother      No Known Problems Father      No Known Problems Maternal Aunt      No Known Problems Paternal Aunt      No Known Problems Maternal Grandmother      No Known Problems Paternal Grandmother       Social History     Tobacco Use    Smoking status: Never    Smokeless tobacco: Never   Substance Use Topics    Alcohol use: Not Currently    Drug use: No     Social Determinants of Health with Concerns     Alcohol Use: Unknown (3/22/2022)    Received from [facility] [employer], [facility]    AUDIT-C     Frequency of Alcohol Consumption: Patient declined     Average Number of Drinks: Patient declined     " Frequency of Binge Drinking: Patient declined   Financial Resource Strain: Unknown (3/22/2022)    Received from [email or web address]    Overall Financial Resource Strain (CARDIA)     Difficulty of Paying Living Expenses: Patient declined   Food Insecurity: Unknown (3/22/2022)    Received from [email or web address]    Hunger Vital Sign     Worried About Running Out of Food in the Last Year: Patient declined     Ran Out of Food in the Last Year: Patient declined   Transportation Needs: Unknown (3/22/2022)    Received from [email or web address]    PRAPARE - Transportation     Lack of Transportation (Medical): Patient declined     Lack of Transportation (Non-Medical): Patient declined   Physical Activity: Unknown (3/22/2022)    Received from [email or web address]    Exercise Vital Sign     Days of Exercise per Week: Patient declined     Minutes of Exercise per Session: Not on file   Stress: Stress Concern Present (3/22/2022)    Received from [employer] [email or web address]    [place] citizen [place] [employer] - Occupational Stress Questionnaire     Feeling of Stress : To some extent   Housing Stability: Not on file   Depression: Not on file   Utilities: Not on file   Health Literacy: Not on file   Social Isolation: Not on file      Review of patient's allergies indicates:   Allergen Reactions    Maxalt [rizatriptan] Other (See Comments)     Pt reports " neck stiffness"          Review of Systems     A full Review of Systems (ROS) was performed and was negative unless otherwise stated in the HPI.      Physical Exam     Vitals:    08/25/24 0602 08/25/24 0741 08/25/24 0802 08/25/24 0846   BP: 115/77 118/71 127/71 127/71   BP Location: Right arm Right arm  Right arm   Patient Position: Lying Lying  Lying   Pulse: 80 98 69 69   Resp: 17 16 16 16   Temp:    98.1 °F (36.7 °C)   TempSrc:    Oral   SpO2: 100% 100% 100% 100%   Weight:       Height:            Physical Exam  Constitutional:  Mildly uncomfortable " appearing 33-year-old female  Eyes: Conjunctivae normal.  ENT       Head: Normocephalic, atraumatic.       Nose: Normal external appearance        Mouth/Throat: no strigulous respirations   Hematological/Lymphatic/Immunilogical: no visible lymphadenopathy   Cardiovascular: Normal rate,   Respiratory: Normal respiratory effort.   Gastrointestinal: non distended   Musculoskeletal: Normal range of motion in all extremities. No obvious deformities or swelling.  Neurologic: Alert, oriented. Normal speech and language. No gross focal neurologic deficits are appreciated.  NIH 0  GCS- 15  CN2-12 intact  5/5 strength all 4 extremities  Normal gait  Negative rhomberg  No appreciable drift  Skin: Skin is warm, dry. No rash noted.  Psychiatric: Mood and affect are normal.   Lab Results- Independently reviewed by myself      Labs Reviewed   CBC W/ AUTO DIFFERENTIAL - Abnormal       Result Value    WBC 7.32      RBC 4.31      Hemoglobin 12.3      Hematocrit 37.1      MCV 86      MCH 28.5      MCHC 33.2      RDW 13.1      Platelets 321      MPV 9.4      Immature Granulocytes 0.3      Gran # (ANC) 5.3      Immature Grans (Abs) 0.02      Lymph # 1.7      Mono # 0.2 (*)     Eos # 0.0      Baso # 0.05      nRBC 0      Gran % 72.2      Lymph % 23.1      Mono % 3.3 (*)     Eosinophil % 0.4      Basophil % 0.7      Differential Method Automated     COMPREHENSIVE METABOLIC PANEL - Abnormal    Sodium 136      Potassium 5.2 (*)     Chloride 105      CO2 20 (*)     Glucose 93      BUN 14      Creatinine 0.9      Calcium 9.2      Total Protein 9.2 (*)     Albumin 4.5      Total Bilirubin 0.5      Alkaline Phosphatase 48 (*)     AST 28      ALT 24      eGFR >60      Anion Gap 11             Imaging     Imaging Results    None                    ED Course         Procedures         Orders Placed This Encounter    CBC Auto Differential    Comprehensive Metabolic Panel    EKG 12-lead    Saline lock IV    sodium chloride 0.9% bolus 1,000 mL 1,000  mL    prochlorperazine injection Soln 10 mg    diphenhydrAMINE injection 25 mg    ketorolac injection 15 mg    dexAMETHasone injection 8 mg    butalbital-acetaminophen-caffeine -40 mg (FIORICET, ESGIC) -40 mg per tablet          ED Course as of 08/26/24 1358   Sun Aug 25, 2024   0835 On reassessment patient resting comfortably generally well-appearing.    Plan will be for discharge, neurologically at baseline. [TK]      ED Course User Index  [TK] Angelito Chavez MD              Medical Decision Making       The patient's list of active medical problems, social history, medications, and allergies as documented per RN staff has been reviewed.     Comorbidities taken into consideration for development of diagnosis and treatment plan include history of migraine.      Medical Decision Making  Headache represents a broad differential with many sinister and benign causes.  Consideration of such causes and a workup with regard to such causes as - Subarachnoid, meningitis, hypertensive urgency and emergency, skull fractures, malignancies, tension headache migraine among a myriad of other causes.    Given this patient's history of chronic migraine and potential underlying precipitating events reassessed following administration of medications.    Neurologically intact, well-appearing, tolerating orals, will plan for discharge, outpatient follow-up returning case of worsening of symptoms this to occur.    Problems Addressed:  Nonintractable headache, unspecified chronicity pattern, unspecified headache type: chronic illness or injury with exacerbation, progression, or side effects of treatment  Syncope: acute illness or injury    Amount and/or Complexity of Data Reviewed  External Data Reviewed: labs, radiology, ECG and notes.     Details: History of migraines  Labs: ordered. Decision-making details documented in ED Course.    Risk  OTC drugs.  Prescription drug management.  Parenteral controlled  substances.                    ED Prescriptions       Medication Sig Dispense Start Date End Date Auth. Provider    butalbital-acetaminophen-caffeine -40 mg (FIORICET, ESGIC) -40 mg per tablet Take 1 tablet by mouth every 4 (four) hours as needed for Pain. 12 tablet 8/25/2024 9/24/2024 Angelito Chavez MD              Clinical Impression       Follow-up Information       Follow up With Specialties Details Why Contact Info    Henrik Galindo MD Obstetrics, Obstetrics and Gynecology Call in 1 day If symptoms worsen, For a follow up visit about today [address] [facility]  [place] [facility] [address]  [phone]              Referrals:  No orders of the defined types were placed in this encounter.      Disposition   ED Disposition Condition    Discharge Stable              Final diagnoses:  [R55] Syncope  [R51.9] Nonintractable headache, unspecified chronicity pattern, unspecified headache type (Primary)        Dami Gale MD        08/26/2024          DISCLAIMER: This note was prepared with [employer] voice recognition transcription software. Garbled syntax, mangled pronouns, and other bizarre constructions may be attributed to that software system.       Angelito Chavez MD  08/26/24 6304

## 2024-08-25 NOTE — DISCHARGE INSTRUCTIONS
Mrs. Moran,    Thank you for letting me care for you today! It was nice meeting you, and I hope you feel better soon.   If you would like access to your chart and what was done today please utilize the [facility].   Please come back to [facility] for all of your future medical needs.    Our goal in the emergency department is to always give you outstanding care and exceptional service. You may receive a survey by mail or e-mail in the next week regarding your experience in our ED. We would greatly appreciate you completing and returning the survey. Your feedback provides us with a way to recognize our staff who give very good care and it helps us learn how to improve when your experience was below our aspiration of excellence.     Sincerely,    Angelito Chavez MD  Board Certified Emergency Physician

## 2024-08-26 LAB
OHS QRS DURATION: 80 MS
OHS QTC CALCULATION: 415 MS

## 2024-09-07 ENCOUNTER — HOSPITAL ENCOUNTER (EMERGENCY)
Facility: OTHER | Age: 34
Discharge: HOME | End: 2024-09-08
Payer: MEDICAID

## 2024-09-07 DIAGNOSIS — G43.909 MIGRAINE WITHOUT STATUS MIGRAINOSUS, NOT INTRACTABLE, UNSPECIFIED MIGRAINE TYPE: Primary | ICD-10-CM

## 2024-09-07 LAB
B-HCG UR QL: NEGATIVE
CTP QC/QA: YES

## 2024-09-07 PROCEDURE — 99284 EMERGENCY DEPT VISIT MOD MDM: CPT | Mod: 25

## 2024-09-07 PROCEDURE — 81025 URINE PREGNANCY TEST: CPT

## 2024-09-07 PROCEDURE — 63600175 PHARM REV CODE 636 W HCPCS

## 2024-09-07 PROCEDURE — 25000003 PHARM REV CODE 250

## 2024-09-07 PROCEDURE — 96374 THER/PROPH/DIAG INJ IV PUSH: CPT

## 2024-09-07 PROCEDURE — 96375 TX/PRO/DX INJ NEW DRUG ADDON: CPT

## 2024-09-07 PROCEDURE — 96361 HYDRATE IV INFUSION ADD-ON: CPT

## 2024-09-07 RX ORDER — ONDANSETRON HYDROCHLORIDE 2 MG/ML
4 INJECTION, SOLUTION INTRAVENOUS
Status: COMPLETED | OUTPATIENT
Start: 2024-09-07 | End: 2024-09-07

## 2024-09-07 RX ORDER — METOCLOPRAMIDE HYDROCHLORIDE 5 MG/ML
10 INJECTION INTRAMUSCULAR; INTRAVENOUS
Status: COMPLETED | OUTPATIENT
Start: 2024-09-07 | End: 2024-09-07

## 2024-09-07 RX ORDER — KETOROLAC TROMETHAMINE 30 MG/ML
15 INJECTION, SOLUTION INTRAMUSCULAR; INTRAVENOUS
Status: COMPLETED | OUTPATIENT
Start: 2024-09-07 | End: 2024-09-07

## 2024-09-07 RX ORDER — METOCLOPRAMIDE HYDROCHLORIDE 5 MG/ML
10 INJECTION INTRAMUSCULAR; INTRAVENOUS
Status: DISCONTINUED | OUTPATIENT
Start: 2024-09-07 | End: 2024-09-07

## 2024-09-07 RX ORDER — DIPHENHYDRAMINE HYDROCHLORIDE 50 MG/ML
12.5 INJECTION INTRAMUSCULAR; INTRAVENOUS
Status: COMPLETED | OUTPATIENT
Start: 2024-09-07 | End: 2024-09-07

## 2024-09-07 RX ADMIN — SODIUM CHLORIDE 1000 ML: 0.9 INJECTION, SOLUTION INTRAVENOUS at 10:09

## 2024-09-07 RX ADMIN — METOCLOPRAMIDE 10 MG: 5 INJECTION, SOLUTION INTRAMUSCULAR; INTRAVENOUS at 10:09

## 2024-09-07 RX ADMIN — DIPHENHYDRAMINE HYDROCHLORIDE 12.5 MG: 50 INJECTION, SOLUTION INTRAMUSCULAR; INTRAVENOUS at 10:09

## 2024-09-07 RX ADMIN — ONDANSETRON 4 MG: 2 INJECTION INTRAMUSCULAR; INTRAVENOUS at 11:09

## 2024-09-07 RX ADMIN — KETOROLAC TROMETHAMINE 15 MG: 30 INJECTION, SOLUTION INTRAMUSCULAR; INTRAVENOUS at 10:09

## 2024-09-08 VITALS
BODY MASS INDEX: 22.16 KG/M2 | DIASTOLIC BLOOD PRESSURE: 56 MMHG | WEIGHT: 133 LBS | RESPIRATION RATE: 16 BRPM | HEART RATE: 72 BPM | SYSTOLIC BLOOD PRESSURE: 107 MMHG | OXYGEN SATURATION: 99 % | HEIGHT: 65 IN | TEMPERATURE: 98 F

## 2024-09-08 NOTE — ED PROVIDER NOTES
"Encounter Date: 9/7/2024       History     Chief Complaint   Patient presents with    Headache     C/o left sided headache since last night. Hx of migraines, pain unrelieved by fioricet at home. Seen at urgent and treated but reports pain still present. +photophobia. Denies n/v or vision changes.     33-year-old female with history of chronic migraines is presenting to the emergency department with acute on chronic migraine.  She states she took Fioricet earlier today without significant relief of pain.  No new falls or trauma.  She was reporting some mild nausea, without vomiting.  No fevers, neck stiffness, cough, congestion, changes in speech or vision.  Headache is consistent with her typical migraine.     The history is provided by the patient. No  was used.     Review of patient's allergies indicates:   Allergen Reactions    Maxalt [rizatriptan] Other (See Comments)     Pt reports " neck stiffness"        Past Medical History:   Diagnosis Date    Migraine headache      Past Surgical History:   Procedure Laterality Date    DILATION AND CURETTAGE OF UTERUS      VAGINAL DELIVERY  12/19/2018     Family History   Problem Relation Name Age of Onset    No Known Problems Mother      No Known Problems Father      No Known Problems Maternal Aunt      No Known Problems Paternal Aunt      No Known Problems Maternal Grandmother      No Known Problems Paternal Grandmother       Social History     Tobacco Use    Smoking status: Never    Smokeless tobacco: Never   Substance Use Topics    Alcohol use: Not Currently    Drug use: No       Physical Exam     Initial Vitals [09/07/24 2056]   BP Pulse Resp Temp SpO2   104/64 70 16 98 °F (36.7 °C) 99 %      MAP       --         Physical Exam    Nursing note and vitals reviewed.  Constitutional: She is not diaphoretic. No distress.   HENT:   Head: Normocephalic and atraumatic.   No frontal or maxillary tenderness    Eyes: Conjunctivae and EOM are normal. Pupils are " equal, round, and reactive to light.   Neck: Neck supple.   No meningismus   Normal range of motion.  Cardiovascular:  Normal rate and regular rhythm.           Pulmonary/Chest: No respiratory distress. She has no wheezes. She has no rhonchi. She has no rales.   Abdominal: Abdomen is soft. She exhibits no distension. There is no abdominal tenderness. There is no rebound and no guarding.   Musculoskeletal:         General: No edema. Normal range of motion.      Cervical back: Normal range of motion and neck supple.     Neurological: She is alert and oriented to person, place, and time. She has normal strength. No cranial nerve deficit or sensory deficit. GCS score is 15. GCS eye subscore is 4. GCS verbal subscore is 5. GCS motor subscore is 6.   Normal gait   Skin: Skin is warm and dry.   Psychiatric: She has a normal mood and affect. Thought content normal.         ED Course   Procedures  Labs Reviewed   POCT URINE PREGNANCY       Result Value    POC Preg Test, Ur Negative       Acceptable Yes            Imaging Results    None          Medications   sodium chloride 0.9% bolus 1,000 mL 1,000 mL (0 mLs Intravenous Stopped 9/7/24 2349)   ketorolac injection 15 mg (15 mg Intravenous Given 9/7/24 2242)   diphenhydrAMINE injection 12.5 mg (12.5 mg Intravenous Given 9/7/24 2248)   metoclopramide injection 10 mg (10 mg Intravenous Given 9/7/24 2249)   ondansetron injection 4 mg (4 mg Intravenous Given 9/7/24 2309)     Medical Decision Making  33-year-old female with history of chronic migraines is presenting to the emergency department with acute on chronic migraine.  She states she took Fioricet earlier today without significant relief of pain.  No new falls or trauma.  No focal neurologic deficit on exam.  She has been seen in the emergency department on multiple other occasions for similar and states that the cocktail of medications provided typically works well for her.  Metoclopramide, Benadryl and  Toradol and IV fluid ordered.  Will reassess.     Amount and/or Complexity of Data Reviewed  Labs: ordered. Decision-making details documented in ED Course.    Risk  Prescription drug management.               ED Course as of 09/08/24 0048   Sat Sep 07, 2024   2232 hCG Qualitative, Urine: Negative [KL]   Sun Sep 08, 2024   0047 Patient feels improved in his ready for discharge. All results were discussed with patient. Strict ED precautions and return instructions were discussed. All questions were answered. Instructed to follow up with primary care doctor for re-evaluation. Stable for discharge and outpatient follow up.     [KL]      ED Course User Index  [KL] Divya Yi MD                           Clinical Impression:  Final diagnoses:  [G43.909] Migraine without status migrainosus, not intractable, unspecified migraine type (Primary)          ED Disposition Condition    Discharge Stable          ED Prescriptions    None       Follow-up Information       Follow up With Specialties Details Why Contact Info    [facility] - Emergency Dept Emergency Medicine Go to  As needed, If symptoms worsen [address] [facility] [id number] [phone]    Henrik Galindo MD Obstetrics, Obstetrics and Gynecology Schedule an appointment as soon as possible for a visit in 2 days  [address] [facility]  [place]  [facility] [address]  [phone]               Divya Yi MD  09/08/24 0048

## 2024-09-08 NOTE — DISCHARGE INSTRUCTIONS
Diagnosis: Headache    Tests today showed:   Labs Reviewed   POCT URINE PREGNANCY       Result Value    POC Preg Test, Ur Negative       Acceptable Yes       Imaging Results    None         Treatments you had today:   Medications   sodium chloride 0.9% bolus 1,000 mL 1,000 mL (0 mLs Intravenous Stopped 9/7/24 2349)   ketorolac injection 15 mg (15 mg Intravenous Given 9/7/24 2242)   diphenhydrAMINE injection 12.5 mg (12.5 mg Intravenous Given 9/7/24 2248)   metoclopramide injection 10 mg (10 mg Intravenous Given 9/7/24 2249)   ondansetron injection 4 mg (4 mg Intravenous Given 9/7/24 2309)       Home Care Instructions:  - Stay well hydrated and well rested  - Rest in a dark and quiet room  - Smoking, caffeine or alcohol use may trigger headaches  - Do not skip meals  - Continue taking your home medications as prescribed    Follow-Up Plan:  - Follow-up with: Primary care doctor within 3 - 5 days  - Additional testing and/or evaluation as directed by your primary doctor    Return to the Emergency Department for symptoms including: worsening symptoms, worsening headache or a new headache which is severe, headache with vision changes, headache with neck stiffness or fever, numbness or tingling, weakness, problems with coordination, speech changes, vomiting with inability to hold down fluids, severe headache different from previous headaches, dizziness, passing out/fainting/unconsciousness, or other concerning symptoms.

## 2024-09-08 NOTE — ED TRIAGE NOTES
Pt presents to ED w/ c/o L sided headache that started last night. Pt reports hx of migraines. Pt states taking Fioricet with no relief at home. Pt endorses photophobia and some nausea. AAOx4. NAD noted.

## 2025-01-27 DIAGNOSIS — G43.709 CHRONIC MIGRAINE WITHOUT AURA WITHOUT STATUS MIGRAINOSUS, NOT INTRACTABLE: Primary | ICD-10-CM

## 2025-01-29 ENCOUNTER — TELEPHONE (OUTPATIENT)
Dept: NEUROLOGY | Facility: CLINIC | Age: 35
End: 2025-01-29
Payer: MEDICAID

## 2025-01-29 NOTE — TELEPHONE ENCOUNTER
Left VM regarding appointment. Informed patient that I would schedule her for April 25th at 9AM. Advised patient to call or message back if the time and date does not work for her.

## 2025-02-12 ENCOUNTER — TELEPHONE (OUTPATIENT)
Dept: NEUROLOGY | Facility: CLINIC | Age: 35
End: 2025-02-12
Payer: MEDICAID

## 2025-02-12 NOTE — TELEPHONE ENCOUNTER
Left VM regarding 4/25 appt. Informed pt that her appt would need to be rescheduled and to call us back at [phone].

## 2025-02-13 ENCOUNTER — TELEPHONE (OUTPATIENT)
Dept: NEUROLOGY | Facility: CLINIC | Age: 35
End: 2025-02-13
Payer: MEDICAID

## 2025-02-13 NOTE — TELEPHONE ENCOUNTER
Left VM regarding 4/25 appt with Roopa. Informed pt that the appt is being canceled and rescheduled with Nasima Colunga NP on 4/28 at 11 AM. Advised pt to call back or message us if time and date does not work for her.